# Patient Record
Sex: MALE | Race: WHITE | NOT HISPANIC OR LATINO
[De-identification: names, ages, dates, MRNs, and addresses within clinical notes are randomized per-mention and may not be internally consistent; named-entity substitution may affect disease eponyms.]

---

## 2017-01-04 ENCOUNTER — APPOINTMENT (OUTPATIENT)
Dept: PULMONOLOGY | Facility: CLINIC | Age: 67
End: 2017-01-04

## 2017-03-31 ENCOUNTER — RX RENEWAL (OUTPATIENT)
Age: 67
End: 2017-03-31

## 2017-05-05 ENCOUNTER — RX RENEWAL (OUTPATIENT)
Age: 67
End: 2017-05-05

## 2017-07-13 ENCOUNTER — APPOINTMENT (OUTPATIENT)
Dept: PULMONOLOGY | Facility: CLINIC | Age: 67
End: 2017-07-13

## 2017-07-13 VITALS
BODY MASS INDEX: 34.99 KG/M2 | SYSTOLIC BLOOD PRESSURE: 151 MMHG | WEIGHT: 210 LBS | RESPIRATION RATE: 18 BRPM | OXYGEN SATURATION: 92 % | HEIGHT: 65 IN | HEART RATE: 70 BPM | DIASTOLIC BLOOD PRESSURE: 88 MMHG

## 2017-07-13 RX ORDER — METFORMIN HYDROCHLORIDE 850 MG/1
850 TABLET, COATED ORAL
Qty: 180 | Refills: 0 | Status: ACTIVE | COMMUNITY
Start: 2017-02-04

## 2017-07-21 ENCOUNTER — RX RENEWAL (OUTPATIENT)
Age: 67
End: 2017-07-21

## 2017-11-14 ENCOUNTER — APPOINTMENT (OUTPATIENT)
Dept: PULMONOLOGY | Facility: CLINIC | Age: 67
End: 2017-11-14

## 2017-11-16 ENCOUNTER — RX RENEWAL (OUTPATIENT)
Age: 67
End: 2017-11-16

## 2018-01-16 ENCOUNTER — RX RENEWAL (OUTPATIENT)
Age: 68
End: 2018-01-16

## 2018-03-28 ENCOUNTER — MEDICATION RENEWAL (OUTPATIENT)
Age: 68
End: 2018-03-28

## 2018-04-02 ENCOUNTER — APPOINTMENT (OUTPATIENT)
Dept: PULMONOLOGY | Facility: CLINIC | Age: 68
End: 2018-04-02
Payer: COMMERCIAL

## 2018-04-02 VITALS
HEART RATE: 64 BPM | SYSTOLIC BLOOD PRESSURE: 150 MMHG | DIASTOLIC BLOOD PRESSURE: 70 MMHG | OXYGEN SATURATION: 93 % | RESPIRATION RATE: 18 BRPM | BODY MASS INDEX: 33.15 KG/M2 | HEIGHT: 65 IN | WEIGHT: 199 LBS

## 2018-04-02 PROCEDURE — 94010 BREATHING CAPACITY TEST: CPT

## 2018-04-02 PROCEDURE — 99214 OFFICE O/P EST MOD 30 MIN: CPT | Mod: 25

## 2018-10-02 ENCOUNTER — APPOINTMENT (OUTPATIENT)
Dept: PULMONOLOGY | Facility: CLINIC | Age: 68
End: 2018-10-02

## 2018-10-16 ENCOUNTER — NON-APPOINTMENT (OUTPATIENT)
Age: 68
End: 2018-10-16

## 2018-10-16 ENCOUNTER — APPOINTMENT (OUTPATIENT)
Dept: PULMONOLOGY | Facility: CLINIC | Age: 68
End: 2018-10-16
Payer: COMMERCIAL

## 2018-10-16 VITALS
SYSTOLIC BLOOD PRESSURE: 130 MMHG | WEIGHT: 200 LBS | HEIGHT: 65 IN | HEART RATE: 59 BPM | RESPIRATION RATE: 17 BRPM | BODY MASS INDEX: 33.32 KG/M2 | DIASTOLIC BLOOD PRESSURE: 80 MMHG | OXYGEN SATURATION: 92 %

## 2018-10-16 DIAGNOSIS — Z23 ENCOUNTER FOR IMMUNIZATION: ICD-10-CM

## 2018-10-16 PROCEDURE — 90662 IIV NO PRSV INCREASED AG IM: CPT

## 2018-10-16 PROCEDURE — 94010 BREATHING CAPACITY TEST: CPT

## 2018-10-16 PROCEDURE — G0008: CPT

## 2018-10-16 PROCEDURE — 99214 OFFICE O/P EST MOD 30 MIN: CPT | Mod: 25

## 2019-02-19 ENCOUNTER — TRANSCRIPTION ENCOUNTER (OUTPATIENT)
Age: 69
End: 2019-02-19

## 2019-02-19 ENCOUNTER — APPOINTMENT (OUTPATIENT)
Dept: PULMONOLOGY | Facility: CLINIC | Age: 69
End: 2019-02-19
Payer: MEDICARE

## 2019-02-19 VITALS
HEIGHT: 63 IN | WEIGHT: 190 LBS | SYSTOLIC BLOOD PRESSURE: 120 MMHG | BODY MASS INDEX: 33.66 KG/M2 | DIASTOLIC BLOOD PRESSURE: 80 MMHG | HEART RATE: 59 BPM | RESPIRATION RATE: 17 BRPM | OXYGEN SATURATION: 93 %

## 2019-02-19 PROCEDURE — 99213 OFFICE O/P EST LOW 20 MIN: CPT

## 2019-02-19 RX ORDER — UMECLIDINIUM BROMIDE AND VILANTEROL TRIFENATATE 62.5; 25 UG/1; UG/1
62.5-25 POWDER RESPIRATORY (INHALATION) DAILY
Qty: 3 | Refills: 2 | Status: DISCONTINUED | COMMUNITY
Start: 2017-07-13 | End: 2019-02-19

## 2019-02-19 RX ORDER — MOMETASONE FUROATE 220 UG/1
220 INHALANT RESPIRATORY (INHALATION)
Qty: 3 | Refills: 1 | Status: DISCONTINUED | COMMUNITY
Start: 2017-07-13 | End: 2019-02-19

## 2019-02-19 NOTE — REASON FOR VISIT
97.7 [Follow-Up] : a follow-up visit [Other: _____] : [unfilled] [FreeTextEntry2] : COPD, abnormal Chest CT, nicotine addiction, rhinitis, and obesity .

## 2019-02-19 NOTE — HISTORY OF PRESENT ILLNESS
[Current] : is a current smoker [Cigarettes ___ /Day] : reports smoking [unfilled] packs of cigarettes per day [___ Pack Year History] : [unfilled] pack year history [Adherent] : the patient is adherent with ~his/her~ medication regimen [Goals--Doing Well] : the patient is doing well with ~his/her~ goals [Side Effects] : ~He/She~ denies medication side effects [de-identified] : Asmanex no longer covered - needs to change to Pulmicort  [FreeTextEntry1] : 67 year old male presents for 3 month follow up of COPD, abnormal Chest CT, nicotine addiction, rhinitis, and obesity . \par Generally he feels well.  \par Some am clear sputum\par Does well with inhalers - he does not feel short of breath all day - His insurance is changing preferred ICS - he now must use Pulmicort instead of Asmanex. \par He states his flonase dries him out too much - he has also stopped using Olopatadine daily due to nasal dryness - he uses it every other day. . \par He denies orthopnea\par He continues to walk with a cane post CVA.\par No falls.\par He is still smoking 1 Pack per day - he declined intervention from smoking cessation program \par He has lost 10 lbs in the last 6 months with modest dietary changes\par He reports he snores  but sleeps well. \par He denies dizziness, chest pain, GERD, diarrhea.\par \par \par

## 2019-02-19 NOTE — PHYSICAL EXAM
[Low Lying Soft Palate] : low lying soft palate [III] : III [Neck Appearance] : the appearance of the neck was normal [Neck Cervical Mass (___cm)] : no neck mass was observed [Jugular Venous Distention Increased] : there was no jugular-venous distention [Thyroid Diffuse Enlargement] : the thyroid was not enlarged [Thyroid Nodule] : there were no palpable thyroid nodules [Heart Rate And Rhythm] : heart rate and rhythm were normal [Heart Sounds] : normal S1 and S2 [Murmurs] : no murmurs present [Respiration, Rhythm And Depth] : normal respiratory rhythm and effort [Exaggerated Use Of Accessory Muscles For Inspiration] : no accessory muscle use [Auscultation Breath Sounds / Voice Sounds] : lungs were clear to auscultation bilaterally [Abdomen Soft] : soft [Abdomen Tenderness] : non-tender [Abdomen Mass (___ Cm)] : no abdominal mass palpated [Abnormal Walk] : normal gait [Gait - Sufficient For Exercise Testing] : the gait was sufficient for exercise testing [Nail Clubbing] : no clubbing of the fingernails [Cyanosis, Localized] : no localized cyanosis [Petechial Hemorrhages (___cm)] : no petechial hemorrhages [] : no ischemic changes [Skin Color & Pigmentation] : normal skin color and pigmentation [Oriented To Time, Place, And Person] : oriented to person, place, and time [General Appearance - Well Developed] : well developed [Normal Appearance] : normal appearance [Well Groomed] : well groomed [General Appearance - Well Nourished] : well nourished [Normal Conjunctiva] : the conjunctiva exhibited no abnormalities [Eyelids - No Xanthelasma] : the eyelids demonstrated no xanthelasmas [FreeTextEntry1] : right hand and leg weakness post CVA

## 2019-02-19 NOTE — ASSESSMENT
[FreeTextEntry1] : 68 year old current smoker with COPD - post CVA for followup\par \par Problem # 1 COPD \par Feels well, tolerates activity /no recent illnesses\par Adherent  with Medications\par Needs to change to Pulmicort from Asmanex due to insurance preferences\par Combivent for rescue - vanegas not need refill at this time \par RTO in 6 months or earlier if needed\par \par Problem # 2 Nicotine addiction\par Still smoking 1 PPD \par Refused Tobacco cessation counseling \par \par Problem # 3 Obesity \par Has lost 10 lbs with modest dietary changes\par Given continued smoking CT pending in August 2019\par \par Problem # 4 Abnormal Chest CT \par Centrilobular emphysema , subsegmental atelectasis , no honeycombing , several subcentimeter nodules \par Repeat CT pending August 2019\par \par RTO in 6 months or sooner if needed\par Chest CT in August 2019\par \par \par \par

## 2019-08-20 ENCOUNTER — APPOINTMENT (OUTPATIENT)
Dept: PULMONOLOGY | Facility: CLINIC | Age: 69
End: 2019-08-20
Payer: MEDICARE

## 2019-08-20 VITALS
HEART RATE: 68 BPM | OXYGEN SATURATION: 91 % | WEIGHT: 185 LBS | RESPIRATION RATE: 16 BRPM | BODY MASS INDEX: 32.78 KG/M2 | HEIGHT: 63 IN | SYSTOLIC BLOOD PRESSURE: 130 MMHG | DIASTOLIC BLOOD PRESSURE: 70 MMHG

## 2019-08-20 PROCEDURE — 94060 EVALUATION OF WHEEZING: CPT

## 2019-08-20 PROCEDURE — ZZZZZ: CPT

## 2019-08-20 PROCEDURE — G0296 VISIT TO DETERM LDCT ELIG: CPT

## 2019-08-20 PROCEDURE — 94729 DIFFUSING CAPACITY: CPT

## 2019-08-20 PROCEDURE — 99214 OFFICE O/P EST MOD 30 MIN: CPT | Mod: 25

## 2019-08-20 PROCEDURE — 99406 BEHAV CHNG SMOKING 3-10 MIN: CPT

## 2019-08-20 NOTE — REASON FOR VISIT
[Follow-Up] : a follow-up visit [Spouse] : spouse [FreeTextEntry1] : abnormal chest CT, COPD, nicotine addiction, and non-allergic rhinitis

## 2019-08-20 NOTE — PHYSICAL EXAM
[General Appearance - Well Developed] : well developed [Normal Appearance] : normal appearance [Well Groomed] : well groomed [General Appearance - Well Nourished] : well nourished [No Deformities] : no deformities [General Appearance - In No Acute Distress] : no acute distress [Normal Conjunctiva] : the conjunctiva exhibited no abnormalities [Eyelids - No Xanthelasma] : the eyelids demonstrated no xanthelasmas [Normal Oropharynx] : normal oropharynx [III] : III [Neck Appearance] : the appearance of the neck was normal [Neck Cervical Mass (___cm)] : no neck mass was observed [Jugular Venous Distention Increased] : there was no jugular-venous distention [Thyroid Diffuse Enlargement] : the thyroid was not enlarged [Thyroid Nodule] : there were no palpable thyroid nodules [Heart Rate And Rhythm] : heart rate and rhythm were normal [Heart Sounds] : normal S1 and S2 [Murmurs] : no murmurs present [Respiration, Rhythm And Depth] : normal respiratory rhythm and effort [Exaggerated Use Of Accessory Muscles For Inspiration] : no accessory muscle use [Abdomen Soft] : soft [Abdomen Tenderness] : non-tender [Abdomen Mass (___ Cm)] : no abdominal mass palpated [Gait - Sufficient For Exercise Testing] : the gait was sufficient for exercise testing [Nail Clubbing] : no clubbing of the fingernails [Cyanosis, Localized] : no localized cyanosis [Petechial Hemorrhages (___cm)] : no petechial hemorrhages [Skin Color & Pigmentation] : normal skin color and pigmentation [] : no rash [No Venous Stasis] : no venous stasis [Skin Lesions] : no skin lesions [No Skin Ulcers] : no skin ulcer [No Xanthoma] : no  xanthoma was observed [Deep Tendon Reflexes (DTR)] : deep tendon reflexes were 2+ and symmetric [Sensation] : the sensory exam was normal to light touch and pinprick [No Focal Deficits] : no focal deficits [Oriented To Time, Place, And Person] : oriented to person, place, and time [Affect] : the affect was normal [Impaired Insight] : insight and judgment were intact [FreeTextEntry1] : using a walker

## 2019-08-20 NOTE — ADDENDUM
[FreeTextEntry1] : Documented by Camron Ibrahim acting as a scribe for Dr. Steven Renee on 08/20/2019.\par \par All medical record entries made by the Scribe were at my, Dr. Steven Renee's, direction and personally dictated by me on 08/20/2019. I have reviewed the chart and agree that the record accurately reflects my personal performance of the history, physical exam, assessment and plan. I have also personally directed, reviewed, and agree with the discharge instructions.

## 2019-08-20 NOTE — REVIEW OF SYSTEMS
[Negative] : Sleep Disorder [Cough] : cough [Arthralgias] : arthralgias [Myalgias] : myalgias [Snoring] : snoring [As Noted in HPI] : as noted in HPI [Recent Wt Loss (___ Lbs)] : recent [unfilled] ~Ulb weight loss [Wheezing] : no wheezing

## 2019-08-20 NOTE — HISTORY OF PRESENT ILLNESS
[FreeTextEntry1] : Mr. Ann is a 67 year old male presenting to the office today for a follow up visit for \par abnormal chest CT, COPD, nicotine addiction, and non-allergic rhinitis. His chief complaint is muscle aches.\par -he reports feeling generally well\par -he reports he is sleeping well, with 6-8 hours of sleep nightly\par -his wife notes he snores slightly\par -he notes his energy level is low at 7/10\par -he reports he isn't walking regularly\par -he reports having lost some weight\par -his wife notes he has difficulty walking down the stairs, and walks about half a block before his legs begin to ache.\par -he reports he is smoking a little bit less than a pack per day\par -he reports coughing in the morning when he wakes up\par -he notes his breathing has been controlled\par -he denies taking any new medications, vitamins, or supplements. \par -he notes he went to a new doctor, who raised his Lisinopril to 20 mg \par -he denies any wheezing, chest pain, chest pressure, diarrhea, constipation, dysphagia, dizziness, sour taste in the mouth, heartburn, reflux

## 2019-08-20 NOTE — ASSESSMENT
[FreeTextEntry1] : Mr. Colón is a 68 year old male with a history of COPD, overweight, OSAS, allergy, CVA, HTN, and nicotine addiction presenting to the office today for a follow up visit. He is currently stable from a pulmonary perspective.\par \par His shortness of breath is multifactorial due to:\par -COPD\par -obesity\par -poor breathing mechanics related to poor balance \par -potential underlying cardiac disease\par \par problem 1: COPD (stable)\par -continue to use Anoro 1 inhalation daily\par -continue to use Asmanex 2 inhalations BID\par -continue to use Combivent as a rescue inhaler\par \par -COPD is a progressive disease and although it can’t be cured , appropriate management can slow its progression, reduce frequency and severity of exacerbations, and improve symptoms and the patient quality of life. Hospitalizations are the greatest contributor to the total COPD costs and account for up to 87% of total COPD related costs. Exacerbations are the main cause of admissions and subsequently account for the 40-75% of COPD costs. Inhaled maintenance therapy reduces the incidence of exacerbations in patients with stable COPD. Incorrect inhaler use and nonadherence are major obstacles to achieving COPD treatment goals. Many COPD patients have challenges (impaired inhalation, limited dexterity, reduced cognition: that limit their ability to correctly use their COPD treatment devices resulting in reduced symptom control. Of most importance is smoking cessation and early intervention with respiratory illnesses and contemplation for pulmonary rehab to enhance quality of life. \par -Inhaler technique reviewed as well as oral hygiene techniques reviewed with patient. Avoidance of cold air, extremes of temperature, rescue inhaler should be used before exercise. Order of medication reviewed with patient. Recommended use of a cool mist humidifier in the bedroom. \par \par problem 2: allergies and sinuses:\par -continue Flonase 1 sniff each nostril BID \par -continue to use Olopatadine 1 sniff/nostril BID \par \par -Environmental measures for allergies were encouraged including mattress and pillow cover, air purifier, and environmental controls. \par \par problem 3: nonallergic rhinitis:\par -continue Atrovent (0.6%) nasal spray 1 sniff each nostril up to TID\par \par problem 4: history of abnormal chest CT / Lung Cancer Screening\par -He was dismissed by Dr. Pieter Solis\par -He should have a follow up low dose chest CT due now.\par -Discussed with patient about the lung cancer screening study (with [facility]), and is being set up for the study\par \par problem 5: smoking and nicotine addiction \par -He is being prescribed Wellbutrin \par -Discussed the risks/associations with continued smoking including COPD, emphysema, shortness of breath, renal cancer, bladder cancer, stroke risk, cardiac disease, etc. Smoking cessation was discussed at length and highly encouraged. Various options to aid cessation was discussed including use of Chantix, Nicotrol, nicotine products, laser therapy, hypnosis, Wellbutrin, etc. \par \par problem 6: obesity \par -Weight loss, exercise, and diet control were discussed and are highly encouraged. Treatment options were given such as, aqua therapy, and contacting a nutritionist. Recommended to use the elliptical, stationary bike, less use of treadmill.  Obesity is associated with worsening asthma, shortness of breath, and potential for cardiac disease, diabetes, and other underlying medical conditions.\par \par problem 7: ? JESS\par -based on his fatigue, EDS, snoring, elevated mallampati class he is being set up for an at home sleep study\par \par -Sleep apnea is associated with adverse clinical consequences which an affect most organ systems.  Cardiovascular disease risk includes arrhythmias, atrial fibrillation, hypertension, coronary artery disease, and stroke. Metabolic disorders include diabetes type 2, non-alcoholic fatty liver disease. Mood disorder especially depression; and cognitive decline especially in the elderly. Associations with  chronic reflux/Cortés’s esophagus some but not all inclusive. \par -Reasons to assess this include arousal consistent with hypopnea; respiratory events most prominent in REM sleep or supine position; therefore sleep staging and body position are important for accurate diagnosis and estimation of AHI. \par \par problem 8: health maintenance \par -recommended yearly flu shot after October 15\par -recommended strep pneumonia vaccines: Prevnar-13 vaccine, followed by Pneumo vaccine 23 one year following\par -recommended early intervention for URIs\par -recommended regular osteoporosis evaluations\par -recommended early dermatological evaluations\par -recommended after the age of 50 to the age of 70, colonoscopy every 5 years \par \par He should follow up in 6 months.\par  He is encouraged to call with any changes, concerns, or questions. \par \par

## 2019-08-20 NOTE — PROCEDURE
[FreeTextEntry1] : PFT with SPI and DLCO revealed moderate-severe obstructive dysfunction, with a FEV1 of 1.39L, which is 51% of predicted, with 11% improvement with use of bronchodilator, and a mildly reduced diffusion of 14.1, which is 66% of predicted, with a normal flow volume loop

## 2019-11-18 ENCOUNTER — TRANSCRIPTION ENCOUNTER (OUTPATIENT)
Age: 69
End: 2019-11-18

## 2020-02-25 ENCOUNTER — NON-APPOINTMENT (OUTPATIENT)
Age: 70
End: 2020-02-25

## 2020-02-25 ENCOUNTER — APPOINTMENT (OUTPATIENT)
Dept: PULMONOLOGY | Facility: CLINIC | Age: 70
End: 2020-02-25
Payer: MEDICARE

## 2020-02-25 VITALS
RESPIRATION RATE: 17 BRPM | WEIGHT: 187 LBS | SYSTOLIC BLOOD PRESSURE: 120 MMHG | HEIGHT: 63 IN | BODY MASS INDEX: 33.13 KG/M2 | OXYGEN SATURATION: 92 % | HEART RATE: 63 BPM | DIASTOLIC BLOOD PRESSURE: 80 MMHG

## 2020-02-25 PROCEDURE — 95012 NITRIC OXIDE EXP GAS DETER: CPT

## 2020-02-25 PROCEDURE — 90670 PCV13 VACCINE IM: CPT

## 2020-02-25 PROCEDURE — 94010 BREATHING CAPACITY TEST: CPT

## 2020-02-25 PROCEDURE — G0009: CPT

## 2020-02-25 PROCEDURE — 99406 BEHAV CHNG SMOKING 3-10 MIN: CPT | Mod: 25

## 2020-02-25 PROCEDURE — 99214 OFFICE O/P EST MOD 30 MIN: CPT | Mod: 25

## 2020-02-25 NOTE — ASSESSMENT
[FreeTextEntry1] : Mr. Colón is a 69 year old male with a history of COPD, overweight, OSAS, allergy, CVA, HTN, and nicotine addiction presenting to the office today for a follow up visit. He is currently stable from a pulmonary perspective. (some mucus production)\par \par His shortness of breath is multifactorial due to:\par -COPD\par -obesity\par -poor breathing mechanics related to poor balance \par -potential underlying cardiac disease\par \par problem 1: COPD (stable)\par - Recommended the use of Aerobika to help bring up mucus \par -continue to use Anoro 1 inhalation daily\par -continue to use Asmanex 2 inhalations BID\par -continue to use Combivent as a rescue inhaler\par \par -COPD is a progressive disease and although it can’t be cured , appropriate management can slow its progression, reduce frequency and severity of exacerbations, and improve symptoms and the patient quality of life. Hospitalizations are the greatest contributor to the total COPD costs and account for up to 87% of total COPD related costs. Exacerbations are the main cause of admissions and subsequently account for the 40-75% of COPD costs. Inhaled maintenance therapy reduces the incidence of exacerbations in patients with stable COPD. Incorrect inhaler use and nonadherence are major obstacles to achieving COPD treatment goals. Many COPD patients have challenges (impaired inhalation, limited dexterity, reduced cognition: that limit their ability to correctly use their COPD treatment devices resulting in reduced symptom control. Of most importance is smoking cessation and early intervention with respiratory illnesses and contemplation for pulmonary rehab to enhance quality of life. \par -Inhaler technique reviewed as well as oral hygiene techniques reviewed with patient. Avoidance of cold air, extremes of temperature, rescue inhaler should be used before exercise. Order of medication reviewed with patient. Recommended use of a cool mist humidifier in the bedroom. \par \par problem 2: allergies and sinuses:\par -continue Flonase 1 sniff each nostril BID \par -continue to use Olopatadine 1 sniff/nostril BID (.6)\par \par -Environmental measures for allergies were encouraged including mattress and pillow cover, air purifier, and environmental controls. \par \par problem 3: nonallergic rhinitis:\par -continue Atrovent (0.6%) nasal spray 1 sniff each nostril up to TID\par \par problem 4: history of abnormal chest CT / Lung Cancer Screening\par -He was dismissed by Dr. Pieter Solis\par -He should have a follow up low dose chest CT 10/2021\par -Discussed with patient about the lung cancer screening study (with [facility]), and is being set up for the study\par \par problem 5: smoking and nicotine addiction (2.25.2020)\par -He is being prescribed Wellbutrin \par -Discussed the risks/associations with continued smoking including COPD, emphysema, shortness of breath, renal cancer, bladder cancer, stroke risk, cardiac disease, etc. Smoking cessation was discussed at length and highly encouraged. Various options to aid cessation was discussed including use of Chantix, Nicotrol, nicotine products, laser therapy, hypnosis, Wellbutrin, etc. \par \par problem 6: obesity \par -Weight loss, exercise, and diet control were discussed and are highly encouraged. Treatment options were given such as, aqua therapy, and contacting a nutritionist. Recommended to use the elliptical, stationary bike, less use of treadmill.  Obesity is associated with worsening asthma, shortness of breath, and potential for cardiac disease, diabetes, and other underlying medical conditions.\par \par problem 7: ? JESS\par -based on his fatigue, EDS, snoring, elevated mallampati class he is being set up for an at home sleep study\par \par -Sleep apnea is associated with adverse clinical consequences which an affect most organ systems.  Cardiovascular disease risk includes arrhythmias, atrial fibrillation, hypertension, coronary artery disease, and stroke. Metabolic disorders include diabetes type 2, non-alcoholic fatty liver disease. Mood disorder especially depression; and cognitive decline especially in the elderly. Associations with  chronic reflux/Cortés’s esophagus some but not all inclusive. \par -Reasons to assess this include arousal consistent with hypopnea; respiratory events most prominent in REM sleep or supine position; therefore sleep staging and body position are important for accurate diagnosis and estimation of AHI. \par \par problem 8: health maintenance \par -recommended yearly flu shot after October 15\par -recommended strep pneumonia vaccines: Prevnar-13 vaccine, followed by Pneumo vaccine 23 one year following (2/2020)\par -recommended early intervention for URIs\par -recommended regular osteoporosis evaluations\par -recommended early dermatological evaluations\par -recommended after the age of 50 to the age of 70, colonoscopy every 5 years \par \par He should follow up in 6 months.\par  He is encouraged to call with any changes, concerns, or questions. \par \par

## 2020-02-25 NOTE — PHYSICAL EXAM
[Normal Appearance] : normal appearance [General Appearance - Well Developed] : well developed [General Appearance - Well Nourished] : well nourished [Well Groomed] : well groomed [No Deformities] : no deformities [Normal Conjunctiva] : the conjunctiva exhibited no abnormalities [General Appearance - In No Acute Distress] : no acute distress [Eyelids - No Xanthelasma] : the eyelids demonstrated no xanthelasmas [Normal Oropharynx] : normal oropharynx [Neck Appearance] : the appearance of the neck was normal [III] : III [Neck Cervical Mass (___cm)] : no neck mass was observed [Thyroid Nodule] : there were no palpable thyroid nodules [Thyroid Diffuse Enlargement] : the thyroid was not enlarged [Jugular Venous Distention Increased] : there was no jugular-venous distention [Heart Rate And Rhythm] : heart rate and rhythm were normal [Heart Sounds] : normal S1 and S2 [Murmurs] : no murmurs present [Respiration, Rhythm And Depth] : normal respiratory rhythm and effort [Abdomen Soft] : soft [Exaggerated Use Of Accessory Muscles For Inspiration] : no accessory muscle use [Abdomen Tenderness] : non-tender [Abdomen Mass (___ Cm)] : no abdominal mass palpated [Gait - Sufficient For Exercise Testing] : the gait was sufficient for exercise testing [Cyanosis, Localized] : no localized cyanosis [Petechial Hemorrhages (___cm)] : no petechial hemorrhages [Nail Clubbing] : no clubbing of the fingernails [Skin Color & Pigmentation] : normal skin color and pigmentation [No Venous Stasis] : no venous stasis [Skin Lesions] : no skin lesions [] : no rash [No Skin Ulcers] : no skin ulcer [No Xanthoma] : no  xanthoma was observed [Deep Tendon Reflexes (DTR)] : deep tendon reflexes were 2+ and symmetric [Sensation] : the sensory exam was normal to light touch and pinprick [Oriented To Time, Place, And Person] : oriented to person, place, and time [No Focal Deficits] : no focal deficits [Impaired Insight] : insight and judgment were intact [Affect] : the affect was normal [FreeTextEntry1] : using a walker

## 2020-02-25 NOTE — HISTORY OF PRESENT ILLNESS
[FreeTextEntry1] : Mr. Ann is a 69 year old male presenting to the office today for a follow up visit for \par abnormal chest CT, COPD, nicotine addiction, and non-allergic rhinitis. His chief complaint is\par - he has been feeling alright, he has some congested sinuses \par - He has not been sleeping well this week \par - He denies palpitations\par - His breathing has been fine \par - He has been using his inhaler everyday \par - He has been smoking a little less than a pack a day\par - He notes his SOB varies, some days its bad some days its better. He uses the inhaler to control his SOB and it has been helping.  \par - He has been snoring slightly \par -he denies any headaches, nausea, vomiting, fever, chills, sweats, chest pain, chest pressure, diarrhea, constipation, dysphagia, dizziness, sour taste in the mouth, leg swelling, leg pain, itchy eyes, itchy ears, heartburn, reflux, myalgias or arthralgias.\par

## 2020-02-25 NOTE — PROCEDURE
[FreeTextEntry1] : PFT revealed mild restrictive obstructive flows, with a FEV1 of 1.03 L, which is 41% of predicted, with a normal flow volume loop\par \par FENO was 21 ; a normal value being less than 25\par Fractional exhaled nitric oxide (FENO) is regarded as a simple, noninvasive method for assessing eosinophilic airway inflammation. Produced by a variety of cells within the lung, nitric oxide (NO) concentrations are generally low in healthy individuals. However, high concentrations of NO appear to be involved in nonspecific host defense mechanisms and chronic inflammatory diseases such as asthma. The American Thor[employer] Society (ATS) therefore has recommended using FENO to aid in the diagnosis and monitoring of eosinophilic airway inflammation and asthma, and for identifying steroid responsive individuals whose chronic respiratory symptoms may be caused by airway inflammation.\par \par Chest CT (10.30.19) reveals there are stable small pulmonary nodules. There is no evidence for malignancy in the thorax or upper abdomen.\par There is atelectasis in the lower lungs, with possible associated scarring, as before. There are findings related to smoking, as before. There is atherosclerosis.\par There are additional findings as are noted in the body of the report, which are stable.\par

## 2020-02-25 NOTE — ADDENDUM
[FreeTextEntry1] : Documented by Tiffanie Mccann  acting as a scribe for Dr. Steven Renee on 02/25/2020.\par \par All medical record entries made by the Scribe were at my, Dr. Steven Renee's, direction and personally dictated by me on 02/25/2020. I have reviewed the chart and agree that the record accurately reflects my personal performance of the history, physical exam, assessment and plan. I have also personally directed, reviewed, and agree with the discharge instructions.\par

## 2020-05-19 RX ORDER — MOMETASONE FUROATE 220 UG/1
220 INHALANT RESPIRATORY (INHALATION)
Qty: 3 | Refills: 1 | Status: DISCONTINUED | COMMUNITY
Start: 2018-04-02 | End: 2020-05-19

## 2020-05-19 RX ORDER — UMECLIDINIUM BROMIDE AND VILANTEROL TRIFENATATE 62.5; 25 UG/1; UG/1
62.5-25 POWDER RESPIRATORY (INHALATION) DAILY
Qty: 3 | Refills: 2 | Status: DISCONTINUED | COMMUNITY
Start: 2018-04-02 | End: 2020-05-19

## 2020-05-19 RX ORDER — BUDESONIDE 180 UG/1
180 AEROSOL, POWDER RESPIRATORY (INHALATION)
Qty: 3 | Refills: 1 | Status: DISCONTINUED | COMMUNITY
Start: 2019-08-20 | End: 2020-05-19

## 2020-05-19 RX ORDER — BUDESONIDE 180 UG/1
180 AEROSOL, POWDER RESPIRATORY (INHALATION)
Qty: 3 | Refills: 1 | Status: DISCONTINUED | OUTPATIENT
Start: 2018-10-30 | End: 2020-05-19

## 2020-08-25 ENCOUNTER — APPOINTMENT (OUTPATIENT)
Dept: PULMONOLOGY | Facility: CLINIC | Age: 70
End: 2020-08-25
Payer: MEDICARE

## 2020-08-25 VITALS
BODY MASS INDEX: 34.41 KG/M2 | WEIGHT: 187 LBS | OXYGEN SATURATION: 93 % | TEMPERATURE: 97.2 F | HEIGHT: 62 IN | RESPIRATION RATE: 16 BRPM | HEART RATE: 62 BPM | DIASTOLIC BLOOD PRESSURE: 70 MMHG | SYSTOLIC BLOOD PRESSURE: 130 MMHG

## 2020-08-25 PROCEDURE — 99214 OFFICE O/P EST MOD 30 MIN: CPT | Mod: 25

## 2020-08-25 PROCEDURE — 94618 PULMONARY STRESS TESTING: CPT

## 2020-08-25 NOTE — HISTORY OF PRESENT ILLNESS
[FreeTextEntry1] : Mr. Ann is a 69 year old male presenting to the office today for a follow up visit for \par abnormal chest CT, COPD, nicotine addiction, and non-allergic rhinitis. His chief complaint is still smoking\par \par -he notes generally feeling well\par -he notes 6 to 7 hours of sleep on average\par -he notes waking well rested\par -He notes His bowels are regular\par -he notes weight increased 8 lbs\par -he denies vocal hoarseness\par -he notes still smoking almost a pack a day \par -he denies cough\par -he denies wheeze\par \par -he denies any chest pain, chest pressure, diarrhea, constipation, dysphagia, dizziness, sour taste in the mouth, leg swelling, leg pain, itchy eyes, itchy ears, heartburn, reflux, myalgias or arthralgias.

## 2020-08-25 NOTE — ADDENDUM
[FreeTextEntry1] : Documented by Jon Perales acting as a scribe for Dr. Steven Renee on 08/25/2020.\par \par All medical record entries made by the Scribe were at my, Dr. Steven Renee's, direction and personally dictated by me on 08/25/2020. I have reviewed the chart and agree that the record accurately reflects my personal performance of the history, physical exam, assessment and plan. I have also personally directed, reviewed, and agree with the discharge instructions.

## 2020-08-25 NOTE — PHYSICAL EXAM
[General Appearance - Well Developed] : well developed [Well Groomed] : well groomed [General Appearance - Well Nourished] : well nourished [Normal Appearance] : normal appearance [No Deformities] : no deformities [General Appearance - In No Acute Distress] : no acute distress [Eyelids - No Xanthelasma] : the eyelids demonstrated no xanthelasmas [Normal Conjunctiva] : the conjunctiva exhibited no abnormalities [Normal Oropharynx] : normal oropharynx [III] : III [Neck Appearance] : the appearance of the neck was normal [Thyroid Diffuse Enlargement] : the thyroid was not enlarged [Thyroid Nodule] : there were no palpable thyroid nodules [Jugular Venous Distention Increased] : there was no jugular-venous distention [Neck Cervical Mass (___cm)] : no neck mass was observed [Heart Sounds] : normal S1 and S2 [Murmurs] : no murmurs present [Heart Rate And Rhythm] : heart rate and rhythm were normal [Exaggerated Use Of Accessory Muscles For Inspiration] : no accessory muscle use [Respiration, Rhythm And Depth] : normal respiratory rhythm and effort [Abdomen Soft] : soft [Abdomen Tenderness] : non-tender [Gait - Sufficient For Exercise Testing] : the gait was sufficient for exercise testing [Abdomen Mass (___ Cm)] : no abdominal mass palpated [Cyanosis, Localized] : no localized cyanosis [Nail Clubbing] : no clubbing of the fingernails [Skin Color & Pigmentation] : normal skin color and pigmentation [Petechial Hemorrhages (___cm)] : no petechial hemorrhages [No Venous Stasis] : no venous stasis [Skin Lesions] : no skin lesions [] : no rash [No Xanthoma] : no  xanthoma was observed [No Skin Ulcers] : no skin ulcer [Sensation] : the sensory exam was normal to light touch and pinprick [Deep Tendon Reflexes (DTR)] : deep tendon reflexes were 2+ and symmetric [Oriented To Time, Place, And Person] : oriented to person, place, and time [No Focal Deficits] : no focal deficits [Affect] : the affect was normal [Impaired Insight] : insight and judgment were intact [FreeTextEntry1] : using a cane

## 2020-08-25 NOTE — PROCEDURE
[FreeTextEntry1] : 6 minute walk test reveals a low saturation of 90% with moderate evidence of dyspnea or fatigue; walked 65.2  meters

## 2020-08-25 NOTE — ASSESSMENT
[FreeTextEntry1] : Mr. Colón is a 69 year old male with a history of COPD, overweight, OSAS, allergy, CVA, HTN, and nicotine addiction presenting to the office today for a follow up visit. He is currently stable from a pulmonary perspective. (stable) \par \par His shortness of breath is multifactorial due to:\par -COPD\par -obesity\par -poor breathing mechanics related to poor balance \par -potential underlying cardiac disease\par \par problem 1: COPD (stable)\par - Recommended the use of Aerobika to help bring up mucus \par -continue to use Anoro 1 inhalation daily\par -continue to use Asmanex 2 inhalations BID\par -continue to use Combivent as a rescue inhaler\par \par -COPD is a progressive disease and although it can’t be cured , appropriate management can slow its progression, reduce frequency and severity of exacerbations, and improve symptoms and the patient quality of life. Hospitalizations are the greatest contributor to the total COPD costs and account for up to 87% of total COPD related costs. Exacerbations are the main cause of admissions and subsequently account for the 40-75% of COPD costs. Inhaled maintenance therapy reduces the incidence of exacerbations in patients with stable COPD. Incorrect inhaler use and nonadherence are major obstacles to achieving COPD treatment goals. Many COPD patients have challenges (impaired inhalation, limited dexterity, reduced cognition: that limit their ability to correctly use their COPD treatment devices resulting in reduced symptom control. Of most importance is smoking cessation and early intervention with respiratory illnesses and contemplation for pulmonary rehab to enhance quality of life. \par -Inhaler technique reviewed as well as oral hygiene techniques reviewed with patient. Avoidance of cold air, extremes of temperature, rescue inhaler should be used before exercise. Order of medication reviewed with patient. Recommended use of a cool mist humidifier in the bedroom. \par \par problem 2: allergies and sinuses:\par -continue Flonase 1 sniff each nostril BID \par -continue to use Olopatadine 1 sniff/nostril BID (.6)\par \par -Environmental measures for allergies were encouraged including mattress and pillow cover, air purifier, and environmental controls. \par \par problem 3: nonallergic rhinitis:\par -continue Atrovent (0.6%) nasal spray 1 sniff each nostril up to TID\par \par problem 4: history of abnormal chest CT / Lung Cancer Screening\par -He was dismissed by Dr. Pieter Solis\par -He should have a follow up low dose chest CT 10/2020\par -Discussed with patient about the lung cancer screening study (with [facility]), and is being set up for the study\par \par problem 5: smoking and nicotine addiction (8.25.2020)\par -He is being prescribed Wellbutrin \par -Discussed the risks/associations with continued smoking including COPD, emphysema, shortness of breath, renal cancer, bladder cancer, stroke risk, cardiac disease, etc. Smoking cessation was discussed at length and highly encouraged. Various options to aid cessation was discussed including use of Chantix, Nicotrol, nicotine products, laser therapy, hypnosis, Wellbutrin, etc. \par \par problem 6: obesity \par -Weight loss, exercise, and diet control were discussed and are highly encouraged. Treatment options were given such as, aqua therapy, and contacting a nutritionist. Recommended to use the elliptical, stationary bike, less use of treadmill.  Obesity is associated with worsening asthma, shortness of breath, and potential for cardiac disease, diabetes, and other underlying medical conditions.\par \par problem 7: ? JESS\par -based on his fatigue, EDS, snoring, elevated mallampati class he is being set up for an at home sleep study\par \par -Sleep apnea is associated with adverse clinical consequences which an affect most organ systems.  Cardiovascular disease risk includes arrhythmias, atrial fibrillation, hypertension, coronary artery disease, and stroke. Metabolic disorders include diabetes type 2, non-alcoholic fatty liver disease. Mood disorder especially depression; and cognitive decline especially in the elderly. Associations with  chronic reflux/Cortés’s esophagus some but not all inclusive. \par -Reasons to assess this include arousal consistent with hypopnea; respiratory events most prominent in REM sleep or supine position; therefore sleep staging and body position are important for accurate diagnosis and estimation of AHI. \par \par problem 8: Health Maintenance/COVID19 Precautions:\par - Clean your hands often. Wash your hands often with soap and water for at least 20 seconds, especially after blowing your nose, coughing, or sneezing, or having been in a public place.\par - If soap and water are not available, use a hand  that contains at least 60% alcohol.\par - To the extent possible, avoid touching high-touch surfaces in public places - elevator buttons, door handles, handrails, handshaking with people, etc. Use a tissue or your sleeve to cover your hand or finger if you must touch something.\par - Wash your hands after touching surfaces in public places.\par - Avoid touching your face, nose, eyes, etc.\par - Clean and disinfect your home to remove germs: practice routine cleaning of frequently touched surfaces (for example: tables, doorknobs, light switches, handles, desks, toilets, faucets, sinks & cell phones)\par - Avoid crowds, especially in poorly ventilated spaces. Your risk of exposure to respiratory viruses like COVID-19 may increase in crowded, closed-in settings with little air circulation if there are people in the crowd who are sick. All patients are recommended to practice social distancing and stay at least 6 feet away from others.\par - Avoid all non-essential travel including plane trips, and especially avoid embarking on cruise ships.\par -If COVID-19 is spreading in your community, take extra measures to put distance between yourself and other people to further reduce your risk of being exposed to this new virus.\par -Stay home as much as possible.\par - Consider ways of getting food brought to your house through family, social, or commercial networks\par -Be aware that the virus has been known to live in the air up to 3 hours post exposure, cardboard up to 24 hours post exposure, copper up to 4 hours post exposure, steel and plastic up to 2-3 days post exposure. Risk of transmission from these surfaces are affected by many variables.\par Immune Support Recommendations:\par -OTC Vitamin C 500mg BID \par -OTC Quercetin 250-500mg BID \par -OTC Zinc 75-100mg per day \par -OTC Melatonin 1 or 2 mg a night \par -OTC Vitamin D 1-4000mg per day \par -OTC Tonic Water 8oz per day\par Asthma and COVID19:\par You need to make sure your asthma is under control. This often requires the use of inhaled corticosteroids (and sometimes oral corticosteroids). Inhaled corticosteroids do not likely reduce your immune system’s ability to fight infections, but oral corticosteroids may. It is important to use the steps above to protect yourself to limit your exposure to any respiratory virus. \par \par problem 9: health maintenance \par -recommended yearly flu shot after October 15\par -recommended strep pneumonia vaccines: Prevnar-13 vaccine, followed by Pneumo vaccine 23 one year following (2/2020)\par -recommended early intervention for URIs\par -recommended regular osteoporosis evaluations\par -recommended early dermatological evaluations\par -recommended after the age of 50 to the age of 70, colonoscopy every 5 years \par \par He should follow up in 6 months.\par  He is encouraged to call with any changes, concerns, or questions. \par \par

## 2020-09-10 RX ORDER — BUDESONIDE 180 UG/1
180 AEROSOL, POWDER RESPIRATORY (INHALATION)
Qty: 3 | Refills: 2 | Status: DISCONTINUED | COMMUNITY
Start: 2019-02-19 | End: 2020-09-10

## 2021-02-25 ENCOUNTER — APPOINTMENT (OUTPATIENT)
Dept: PULMONOLOGY | Facility: CLINIC | Age: 71
End: 2021-02-25
Payer: MEDICARE

## 2021-02-25 VITALS
SYSTOLIC BLOOD PRESSURE: 126 MMHG | OXYGEN SATURATION: 93 % | TEMPERATURE: 97.2 F | BODY MASS INDEX: 34.96 KG/M2 | HEART RATE: 72 BPM | DIASTOLIC BLOOD PRESSURE: 78 MMHG | RESPIRATION RATE: 16 BRPM | HEIGHT: 62 IN | WEIGHT: 190 LBS

## 2021-02-25 PROCEDURE — 99072 ADDL SUPL MATRL&STAF TM PHE: CPT

## 2021-02-25 PROCEDURE — 99214 OFFICE O/P EST MOD 30 MIN: CPT | Mod: 25

## 2021-02-25 PROCEDURE — 99406 BEHAV CHNG SMOKING 3-10 MIN: CPT | Mod: 25

## 2021-02-25 NOTE — ASSESSMENT
[FreeTextEntry1] : Mr. Colón is a 69 year old male with a history of COPD, overweight, OSAS, allergy, CVA, HTN, and nicotine addiction presenting to the office today for a follow up visit. He is currently stable from a pulmonary perspective. (stable) , poor sleep\par \par His shortness of breath is multifactorial due to:\par -COPD\par -obesity\par -poor breathing mechanics related to poor balance \par -potential underlying cardiac disease\par \par problem 1: COPD (stable)\par - Recommended the use of Aerobika to help bring up mucus \par -continue to use Anoro 1 inhalation daily\par -continue to use Asmanex 2 inhalations BID\par -continue to use Combivent as a rescue inhaler\par \par -COPD is a progressive disease and although it can’t be cured , appropriate management can slow its progression, reduce frequency and severity of exacerbations, and improve symptoms and the patient quality of life. Hospitalizations are the greatest contributor to the total COPD costs and account for up to 87% of total COPD related costs. Exacerbations are the main cause of admissions and subsequently account for the 40-75% of COPD costs. Inhaled maintenance therapy reduces the incidence of exacerbations in patients with stable COPD. Incorrect inhaler use and nonadherence are major obstacles to achieving COPD treatment goals. Many COPD patients have challenges (impaired inhalation, limited dexterity, reduced cognition: that limit their ability to correctly use their COPD treatment devices resulting in reduced symptom control. Of most importance is smoking cessation and early intervention with respiratory illnesses and contemplation for pulmonary rehab to enhance quality of life. \par -Inhaler technique reviewed as well as oral hygiene techniques reviewed with patient. Avoidance of cold air, extremes of temperature, rescue inhaler should be used before exercise. Order of medication reviewed with patient. Recommended use of a cool mist humidifier in the bedroom. \par \par problem 2: allergies and sinuses:\par -continue Flonase 1 sniff each nostril BID \par -continue to use Olopatadine 1 sniff/nostril BID (.6)\par \par -Environmental measures for allergies were encouraged including mattress and pillow cover, air purifier, and environmental controls. \par \par problem 3: nonallergic rhinitis:\par -continue Atrovent (0.6%) nasal spray 1 sniff each nostril up to TID\par \par problem 4: history of abnormal chest CT / Lung Cancer Screening\par -He was dismissed by Dr. Pieter Solis\par -He should have a follow up low dose chest CT 10/2020, next 3/2021 (pending)\par -Discussed with patient about the lung cancer screening study (with [facility]), and is being set up for the study\par \par CAT scans are the only radiological modality to identify abnormalities w/in the lungs with regards to nodules/masses/lymph nodes. Risks, benefits were reviewed in detail. The guidelines for abnormalities include follow up CT scans at various intervals which could range from 6 weeks to 1 year intervals. If there is a change for the worse then consideration for a biopsy will be considered if you are a candidate. Second opinion evaluation with a thoracic surgeon or an interventional radiologist could be offered. \par \par problem 5: smoking and nicotine addiction ( 02/25/2021 )\par -He is being prescribed Wellbutrin \par -Discussed the risks/associations with continued smoking including COPD, emphysema, shortness of breath, renal cancer, bladder cancer, stroke risk, cardiac disease, etc. Smoking cessation was discussed at length and highly encouraged. Various options to aid cessation was discussed including use of Chantix, Nicotrol, nicotine products, laser therapy, hypnosis, Wellbutrin, etc. \par \par problem 6: obesity \par -Weight loss, exercise, and diet control were discussed and are highly encouraged. Treatment options were given such as, aqua therapy, and contacting a nutritionist. Recommended to use the elliptical, stationary bike, less use of treadmill.  Obesity is associated with worsening asthma, shortness of breath, and potential for cardiac disease, diabetes, and other underlying medical conditions.\par \par problem 7: ? JESS (no interest)\par -based on his fatigue, EDS, snoring, elevated mallampati class he is being set up for an at home sleep study\par \par -Sleep apnea is associated with adverse clinical consequences which an affect most organ systems.  Cardiovascular disease risk includes arrhythmias, atrial fibrillation, hypertension, coronary artery disease, and stroke. Metabolic disorders include diabetes type 2, non-alcoholic fatty liver disease. Mood disorder especially depression; and cognitive decline especially in the elderly. Associations with  chronic reflux/Cortés’s esophagus some but not all inclusive. \par -Reasons to assess this include arousal consistent with hypopnea; respiratory events most prominent in REM sleep or supine position; therefore sleep staging and body position are important for accurate diagnosis and estimation of AHI. \par \par problem 8: Health Maintenance/COVID19 Precautions:\par -S/p Covid vaccine 2/2021\par - Clean your hands often. Wash your hands often with soap and water for at least 20 seconds, especially after blowing your nose, coughing, or sneezing, or having been in a public place.\par - If soap and water are not available, use a hand  that contains at least 60% alcohol.\par - To the extent possible, avoid touching high-touch surfaces in public places - elevator buttons, door handles, handrails, handshaking with people, etc. Use a tissue or your sleeve to cover your hand or finger if you must touch something.\par - Wash your hands after touching surfaces in public places.\par - Avoid touching your face, nose, eyes, etc.\par - Clean and disinfect your home to remove germs: practice routine cleaning of frequently touched surfaces (for example: tables, doorknobs, light switches, handles, desks, toilets, faucets, sinks & cell phones)\par - Avoid crowds, especially in poorly ventilated spaces. Your risk of exposure to respiratory viruses like COVID-19 may increase in crowded, closed-in settings with little air circulation if there are people in the crowd who are sick. All patients are recommended to practice social distancing and stay at least 6 feet away from others.\par - Avoid all non-essential travel including plane trips, and especially avoid embarking on cruise ships.\par -If COVID-19 is spreading in your community, take extra measures to put distance between yourself and other people to further reduce your risk of being exposed to this new virus.\par -Stay home as much as possible.\par - Consider ways of getting food brought to your house through family, social, or commercial networks\par -Be aware that the virus has been known to live in the air up to 3 hours post exposure, cardboard up to 24 hours post exposure, copper up to 4 hours post exposure, steel and plastic up to 2-3 days post exposure. Risk of transmission from these surfaces are affected by many variables.\par Immune Support Recommendations:\par -OTC Vitamin C 500mg BID \par -OTC Quercetin 250-500mg BID \par -OTC Zinc 75-100mg per day \par -OTC Melatonin 1 or 2 mg a night \par -OTC Vitamin D 1-4000mg per day \par -OTC Tonic Water 8oz per day\par Asthma and COVID19:\par You need to make sure your asthma is under control. This often requires the use of inhaled corticosteroids (and sometimes oral corticosteroids). Inhaled corticosteroids do not likely reduce your immune system’s ability to fight infections, but oral corticosteroids may. It is important to use the steps above to protect yourself to limit your exposure to any respiratory virus. \par \par problem 9: health maintenance \par -recommended yearly flu shot after October 15\par -recommended strep pneumonia vaccines: Prevnar-13 vaccine, followed by Pneumo vaccine 23 one year following (2/2020)\par -recommended early intervention for URIs\par -recommended regular osteoporosis evaluations\par -recommended early dermatological evaluations\par -recommended after the age of 50 to the age of 70, colonoscopy every 5 years \par \par He should follow up in 6 months.\par  He is encouraged to call with any changes, concerns, or questions. \par \par

## 2021-02-25 NOTE — REVIEW OF SYSTEMS
[Negative] : Endocrine [Chest Discomfort] : no chest discomfort [GERD] : no gerd [Diarrhea] : no diarrhea [Constipation] : no constipation [Dysphagia] : no dysphagia [Dizziness] : no dizziness

## 2021-02-25 NOTE — PHYSICAL EXAM

## 2021-02-25 NOTE — HISTORY OF PRESENT ILLNESS
[FreeTextEntry1] : Mr. Ann is a 70 year old male presenting to the office today for a follow up visit for \par abnormal chest CT, COPD, nicotine addiction, and non-allergic rhinitis. His chief complaint is odd sleep pattern\par -he reports feeling generally well\par -he states he has been sleeping less lately, about 5 hours. He is unsure why\par -he notes his energy level is okay\par -he report he doesn’t get much physical activity\par -he is s/p the 1st Moderna Covid injection with no issue\par -he reports he lost about 50 pounds\par -he states he believes he snores mildly\par -he states his memory and concentration are good \par -he reports he is still smoking about 1 pack per day\par -he denies taking any new medications, vitamins, or supplements. \par -he denies any chest pain, chest pressure, diarrhea, constipation, dysphagia, dizziness, sour taste in the mouth, leg swelling, leg pain, itchy eyes, itchy ears, heartburn, reflux, myalgias or arthralgias.

## 2021-03-12 ENCOUNTER — NON-APPOINTMENT (OUTPATIENT)
Age: 71
End: 2021-03-12

## 2021-08-02 DIAGNOSIS — Z01.812 ENCOUNTER FOR PREPROCEDURAL LABORATORY EXAMINATION: ICD-10-CM

## 2021-08-26 ENCOUNTER — APPOINTMENT (OUTPATIENT)
Dept: PULMONOLOGY | Facility: CLINIC | Age: 71
End: 2021-08-26
Payer: MEDICARE

## 2021-08-26 VITALS
RESPIRATION RATE: 16 BRPM | BODY MASS INDEX: 35.44 KG/M2 | SYSTOLIC BLOOD PRESSURE: 120 MMHG | WEIGHT: 200 LBS | HEART RATE: 100 BPM | HEIGHT: 63 IN | DIASTOLIC BLOOD PRESSURE: 70 MMHG | TEMPERATURE: 97.7 F | OXYGEN SATURATION: 90 %

## 2021-08-26 PROCEDURE — ZZZZZ: CPT

## 2021-08-26 PROCEDURE — 99406 BEHAV CHNG SMOKING 3-10 MIN: CPT | Mod: 25

## 2021-08-26 PROCEDURE — 99214 OFFICE O/P EST MOD 30 MIN: CPT | Mod: 25

## 2021-08-26 RX ORDER — FLUTICASONE PROPIONATE 220 UG/1
220 AEROSOL, METERED RESPIRATORY (INHALATION) TWICE DAILY
Qty: 3 | Refills: 1 | Status: DISCONTINUED | COMMUNITY
Start: 2020-09-10 | End: 2021-08-26

## 2021-08-26 NOTE — PROCEDURE
[FreeTextEntry1] : CT chest 3/3/2021) reveals stable CT chest with emphysema, airway disease, and a few stable subcentimeter noncalcified upper lobe pulmonary nodules measuring up to 3mm. There is more than 3 year stability. There is no new pulmonary nodule, thoracic lymphadenopathy or actibve pulmonary parenchymal disease. \par

## 2021-08-26 NOTE — HISTORY OF PRESENT ILLNESS
[FreeTextEntry1] : Mr. Ann is a 70 year old male presenting to the office today for a follow up visit for \par abnormal chest CT, COPD, nicotine addiction, and non-allergic rhinitis. His chief complaint is odd sleep pattern\par -he notes he feels alright\par -he notes feeling like he gets less and less sleep \par -he notes low energy levels \par -he denies visual issues\par -he notes his right leg is "shot" and give him pain \par -he notes congestion in his sinuses\par -no new medications, vitamins, or supplements \par -he notes having lost a lot of weight but is slowly regaining the weight \par -s/p covid-19 vaccine x2\par -he denies recent travel \par -he notes not knowing why he doesn’t get enough sleep \par -he notes still smoking \par patient denies any headaches, nausea, vomiting, fever, chills, sweats, chest pain, chest pressure, palpitations, coughing, wheezing, fatigue, diarrhea, constipation, dysphagia, myalgias, dizziness, leg swelling, leg pain, itchy eyes, itchy ears, heartburn, reflux or sour taste in the mouth

## 2021-08-26 NOTE — ADDENDUM
[FreeTextEntry1] : Documented by Julianna Voss acting as a scribe for Dr. Steven Renee on (08/26/2021).\par \par All medical record entries made by the Scribe were at my, Dr. Steven Renee's, direction and personally dictated by me on (08/26/2021). I have reviewed the chart and agree that the record accurately reflects my personal performance of the history, physical exam, assessment and plan. I have also personally directed, reviewed, and agree with the discharge instructions.\par

## 2021-08-26 NOTE — PHYSICAL EXAM
[No Acute Distress] : no acute distress [Normal Oropharynx] : normal oropharynx [Normal Appearance] : normal appearance [No Neck Mass] : no neck mass [Normal Rate/Rhythm] : normal rate/rhythm [Normal S1, S2] : normal s1, s2 [No Murmurs] : no murmurs [No Resp Distress] : no resp distress [Clear to Auscultation Bilaterally] : clear to auscultation bilaterally [Benign] : benign [Normal Gait] : normal gait [No Clubbing] : no clubbing [No Cyanosis] : no cyanosis [No Edema] : no edema [FROM] : FROM [Normal Color/ Pigmentation] : normal color/ pigmentation [No Focal Deficits] : no focal deficits [Oriented x3] : oriented x3 [Normal Affect] : normal affect [III] : Mallampati Class: III [Kyphosis] : kyphosis [TextBox_2] : ow  [TextBox_68] : I:E 1:3; mild expiratory wheeze

## 2021-08-26 NOTE — ASSESSMENT
[FreeTextEntry1] : Mr. Colón is a 70 year old male with a history of COPD, overweight, OSAS, allergy, CVA, HTN, and nicotine addiction presenting to the office today for a follow up visit. He is currently stable from a pulmonary perspective. (stable) , poor sleep #1\par \par His shortness of breath is multifactorial due to:\par -COPD\par -obesity\par -poor breathing mechanics related to poor balance \par -potential underlying cardiac disease\par \par problem 1: COPD (stable)\par - Recommended the use of Aerobika to help bring up mucus \par -continue to use Anoro 1 inhalation daily\par -continue to use Asmanex 2 inhalations BID\par -continue to use Combivent as a rescue inhaler\par \par -COPD is a progressive disease and although it can’t be cured , appropriate management can slow its progression, reduce frequency and severity of exacerbations, and improve symptoms and the patient quality of life. Hospitalizations are the greatest contributor to the total COPD costs and account for up to 87% of total COPD related costs. Exacerbations are the main cause of admissions and subsequently account for the 40-75% of COPD costs. Inhaled maintenance therapy reduces the incidence of exacerbations in patients with stable COPD. Incorrect inhaler use and nonadherence are major obstacles to achieving COPD treatment goals. Many COPD patients have challenges (impaired inhalation, limited dexterity, reduced cognition: that limit their ability to correctly use their COPD treatment devices resulting in reduced symptom control. Of most importance is smoking cessation and early intervention with respiratory illnesses and contemplation for pulmonary rehab to enhance quality of life. \par -Inhaler technique reviewed as well as oral hygiene techniques reviewed with patient. Avoidance of cold air, extremes of temperature, rescue inhaler should be used before exercise. Order of medication reviewed with patient. Recommended use of a cool mist humidifier in the bedroom. \par \par problem 2: allergies and sinuses:\par -continue Flonase 1 sniff each nostril BID \par -continue to use Olopatadine 1 sniff/nostril BID (.6)\par \par -Environmental measures for allergies were encouraged including mattress and pillow cover, air purifier, and environmental controls. \par \par problem 3: nonallergic rhinitis:\par -continue Atrovent (0.6%) nasal spray 1 sniff each nostril up to TID\par \par problem 4: history of abnormal chest CT / Lung Cancer Screening\par -He was dismissed by Dr. Pieter Solis\par -He should have a follow up low dose chest CT 10/2020, next 3/2022 \par -Discussed with patient about the lung cancer screening study (with Ele[facility]), and is being set up for the study\par \par CAT scans are the only radiological modality to identify abnormalities w/in the lungs with regards to nodules/masses/lymph nodes. Risks, benefits were reviewed in detail. The guidelines for abnormalities include follow up CT scans at various intervals which could range from 6 weeks to 1 year intervals. If there is a change for the worse then consideration for a biopsy will be considered if you are a candidate. Second opinion evaluation with a thoracic surgeon or an interventional radiologist could be offered. \par \par problem 5: smoking and nicotine addiction ( 5/26/2021 )\par -He is being prescribed Wellbutrin \par -Discussed the risks/associations with continued smoking including COPD, emphysema, shortness of breath, renal cancer, bladder cancer, stroke risk, cardiac disease, etc. Smoking cessation was discussed at length and highly encouraged. Various options to aid cessation was discussed including use of Chantix, Nicotrol, nicotine products, laser therapy, hypnosis, Wellbutrin, etc. \par \par problem 6: obesity \par -Weight loss, exercise, and diet control were discussed and are highly encouraged. Treatment options were given such as, aqua therapy, and contacting a nutritionist. Recommended to use the elliptical, stationary bike, less use of treadmill.  Obesity is associated with worsening asthma, shortness of breath, and potential for cardiac disease, diabetes, and other underlying medical conditions.\par \par problem 7: ? JESS (no interest)\par -based on his fatigue, EDS, snoring, elevated mallampati class he is being set up for an at home sleep study\par \par -Sleep apnea is associated with adverse clinical consequences which an affect most organ systems.  Cardiovascular disease risk includes arrhythmias, atrial fibrillation, hypertension, coronary artery disease, and stroke. Metabolic disorders include diabetes type 2, non-alcoholic fatty liver disease. Mood disorder especially depression; and cognitive decline especially in the elderly. Associations with  chronic reflux/Cortés’s esophagus some but not all inclusive. \par -Reasons to assess this include arousal consistent with hypopnea; respiratory events most prominent in REM sleep or supine position; therefore sleep staging and body position are important for accurate diagnosis and estimation of AHI. \par \par problem 8: Health Maintenance/COVID19 Precautions:\par -S/p Covid vaccine 2/2021\par - Clean your hands often. Wash your hands often with soap and water for at least 20 seconds, especially after blowing your nose, coughing, or sneezing, or having been in a public place.\par - If soap and water are not available, use a hand  that contains at least 60% alcohol.\par - To the extent possible, avoid touching high-touch surfaces in public places - elevator buttons, door handles, handrails, handshaking with people, etc. Use a tissue or your sleeve to cover your hand or finger if you must touch something.\par - Wash your hands after touching surfaces in public places.\par - Avoid touching your face, nose, eyes, etc.\par - Clean and disinfect your home to remove germs: practice routine cleaning of frequently touched surfaces (for example: tables, doorknobs, light switches, handles, desks, toilets, faucets, sinks & cell phones)\par - Avoid crowds, especially in poorly ventilated spaces. Your risk of exposure to respiratory viruses like COVID-19 may increase in crowded, closed-in settings with little air circulation if there are people in the crowd who are sick. All patients are recommended to practice social distancing and stay at least 6 feet away from others.\par - Avoid all non-essential travel including plane trips, and especially avoid embarking on cruise ships.\par -If COVID-19 is spreading in your community, take extra measures to put distance between yourself and other people to further reduce your risk of being exposed to this new virus.\par -Stay home as much as possible.\par - Consider ways of getting food brought to your house through family, social, or commercial networks\par -Be aware that the virus has been known to live in the air up to 3 hours post exposure, cardboard up to 24 hours post exposure, copper up to 4 hours post exposure, steel and plastic up to 2-3 days post exposure. Risk of transmission from these surfaces are affected by many variables.\par Immune Support Recommendations:\par -OTC Vitamin C 500mg BID \par -OTC Quercetin 250-500mg BID \par -OTC Zinc 75-100mg per day \par -OTC Melatonin 1 or 2 mg a night \par -OTC Vitamin D 1-4000mg per day \par -OTC Tonic Water 8oz per day\par Asthma and COVID19:\par You need to make sure your asthma is under control. This often requires the use of inhaled corticosteroids (and sometimes oral corticosteroids). Inhaled corticosteroids do not likely reduce your immune system’s ability to fight infections, but oral corticosteroids may. It is important to use the steps above to protect yourself to limit your exposure to any respiratory virus. \par \par problem 9: health maintenance \par -recommended yearly flu shot after October 15\par -recommended strep pneumonia vaccines: Prevnar-13 vaccine, followed by Pneumo vaccine 23 one year following (2/2020)\par -recommended early intervention for URIs\par -recommended regular osteoporosis evaluations\par -recommended early dermatological evaluations\par -recommended after the age of 50 to the age of 70, colonoscopy every 5 years \par \par He should follow up in 6 months.\par  He is encouraged to call with any changes, concerns, or questions. \par \par

## 2021-11-18 ENCOUNTER — NON-APPOINTMENT (OUTPATIENT)
Age: 71
End: 2021-11-18

## 2022-02-10 ENCOUNTER — APPOINTMENT (OUTPATIENT)
Dept: PULMONOLOGY | Facility: CLINIC | Age: 72
End: 2022-02-10

## 2022-04-05 ENCOUNTER — NON-APPOINTMENT (OUTPATIENT)
Age: 72
End: 2022-04-05

## 2022-04-06 ENCOUNTER — APPOINTMENT (OUTPATIENT)
Dept: PULMONOLOGY | Facility: CLINIC | Age: 72
End: 2022-04-06
Payer: MEDICARE

## 2022-04-06 VITALS
DIASTOLIC BLOOD PRESSURE: 80 MMHG | SYSTOLIC BLOOD PRESSURE: 130 MMHG | TEMPERATURE: 97.3 F | RESPIRATION RATE: 16 BRPM | OXYGEN SATURATION: 95 % | WEIGHT: 204 LBS | HEIGHT: 61 IN | HEART RATE: 78 BPM | BODY MASS INDEX: 38.51 KG/M2

## 2022-04-06 PROCEDURE — 95012 NITRIC OXIDE EXP GAS DETER: CPT

## 2022-04-06 PROCEDURE — ZZZZZ: CPT

## 2022-04-06 PROCEDURE — 94729 DIFFUSING CAPACITY: CPT

## 2022-04-06 PROCEDURE — 94010 BREATHING CAPACITY TEST: CPT

## 2022-04-06 PROCEDURE — 94727 GAS DIL/WSHOT DETER LNG VOL: CPT

## 2022-04-06 PROCEDURE — 99214 OFFICE O/P EST MOD 30 MIN: CPT | Mod: 25

## 2022-04-06 PROCEDURE — 99406 BEHAV CHNG SMOKING 3-10 MIN: CPT | Mod: 25

## 2022-04-06 NOTE — ASSESSMENT
[FreeTextEntry1] : Mr. Colón is a 71 year old male with a history of COPD, overweight, OSAS, allergy, CVA, HTN, and nicotine addiction presenting to the office today for a follow up visit. He is currently stable from a pulmonary perspective. (stable) , poor sleep #1 (still), still smoking\par \par His shortness of breath is multifactorial due to:\par -COPD\par -obesity\par -poor breathing mechanics related to poor balance \par -potential underlying cardiac disease\par \par problem 1: COPD (stable)\par - Recommended the use of Aerobika to help bring up mucus \par -change Anoro 1 inhalation daily and Pulmicort 2 inhalations BID  to Trelegy 100 1 inhalation qD \par -continue to use Asmanex 2 inhalations BID\par -continue to use Combivent as a rescue inhaler\par -COPD is a progressive disease and although it can’t be cured , appropriate management can slow its progression, reduce frequency and severity of exacerbations, and improve symptoms and the patient quality of life. Hospitalizations are the greatest contributor to the total COPD costs and account for up to 87% of total COPD related costs. Exacerbations are the main cause of admissions and subsequently account for the 40-75% of COPD costs. Inhaled maintenance therapy reduces the incidence of exacerbations in patients with stable COPD. Incorrect inhaler use and nonadherence are major obstacles to achieving COPD treatment goals. Many COPD patients have challenges (impaired inhalation, limited dexterity, reduced cognition: that limit their ability to correctly use their COPD treatment devices resulting in reduced symptom control. Of most importance is smoking cessation and early intervention with respiratory illnesses and contemplation for pulmonary rehab to enhance quality of life. \par -Inhaler technique reviewed as well as oral hygiene techniques reviewed with patient. Avoidance of cold air, extremes of temperature, rescue inhaler should be used before exercise. Order of medication reviewed with patient. Recommended use of a cool mist humidifier in the bedroom. \par \par problem 2: allergies and sinuses:\par -continue Flonase 1 sniff each nostril BID \par -continue to use Olopatadine 1 sniff/nostril BID (.6)\par \par -Environmental measures for allergies were encouraged including mattress and pillow cover, air purifier, and environmental controls. \par \par problem 3: nonallergic rhinitis:\par -continue Atrovent (0.6%) nasal spray 1 sniff each nostril up to TID\par \par problem 4: history of abnormal chest CT / Lung Cancer Screening - due\par -He was dismissed by Dr. Pieter Solis\par -He should have a follow up low dose chest CT 10/2020, next 3/2022 \par -Discussed with patient about the lung cancer screening study (with Dez Marion), and is being set up for the study\par \par CAT scans are the only radiological modality to identify abnormalities w/in the lungs with regards to nodules/masses/lymph nodes. Risks, benefits were reviewed in detail. The guidelines for abnormalities include follow up CT scans at various intervals which could range from 6 weeks to 1 year intervals. If there is a change for the worse then consideration for a biopsy will be considered if you are a candidate. Second opinion evaluation with a thoracic surgeon or an interventional radiologist could be offered. \par \par problem 5: smoking and nicotine addiction (4/6/2022)\par -He is being prescribed Wellbutrin \par -Discussed the risks/associations with continued smoking including COPD, emphysema, shortness of breath, renal cancer, bladder cancer, stroke risk, cardiac disease, etc. Smoking cessation was discussed at length and highly encouraged. Various options to aid cessation was discussed including use of Chantix, Nicotrol, nicotine products, laser therapy, hypnosis, Wellbutrin, etc. \par \par problem 6: obesity \par -Weight loss, exercise, and diet control were discussed and are highly encouraged. Treatment options were given such as, aqua therapy, and contacting a nutritionist. Recommended to use the elliptical, stationary bike, less use of treadmill.  Obesity is associated with worsening asthma, shortness of breath, and potential for cardiac disease, diabetes, and other underlying medical conditions.\par \par problem 7: ? JESS (no interest)\par -Refused Rx/Dx\par -based on his fatigue, EDS, snoring, elevated mallampati class he is being set up for an at home sleep study\par \par -Sleep apnea is associated with adverse clinical consequences which an affect most organ systems.  Cardiovascular disease risk includes arrhythmias, atrial fibrillation, hypertension, coronary artery disease, and stroke. Metabolic disorders include diabetes type 2, non-alcoholic fatty liver disease. Mood disorder especially depression; and cognitive decline especially in the elderly. Associations with  chronic reflux/Cortés’s esophagus some but not all inclusive. \par -Reasons to assess this include arousal consistent with hypopnea; respiratory events most prominent in REM sleep or supine position; therefore sleep staging and body position are important for accurate diagnosis and estimation of AHI. \par \par problem 8: Health Maintenance/COVID19 Precautions:\par -S/p Covid vaccine x3\par - Clean your hands often. Wash your hands often with soap and water for at least 20 seconds, especially after blowing your nose, coughing, or sneezing, or having been in a public place.\par - If soap and water are not available, use a hand  that contains at least 60% alcohol.\par - To the extent possible, avoid touching high-touch surfaces in public places - elevator buttons, door handles, handrails, handshaking with people, etc. Use a tissue or your sleeve to cover your hand or finger if you must touch something.\par - Wash your hands after touching surfaces in public places.\par - Avoid touching your face, nose, eyes, etc.\par - Clean and disinfect your home to remove germs: practice routine cleaning of frequently touched surfaces (for example: tables, doorknobs, light switches, handles, desks, toilets, faucets, sinks & cell phones)\par - Avoid crowds, especially in poorly ventilated spaces. Your risk of exposure to respiratory viruses like COVID-19 may increase in crowded, closed-in settings with little air circulation if there are people in the crowd who are sick. All patients are recommended to practice social distancing and stay at least 6 feet away from others.\par - Avoid all non-essential travel including plane trips, and especially avoid embarking on cruise ships.\par -If COVID-19 is spreading in your community, take extra measures to put distance between yourself and other people to further reduce your risk of being exposed to this new virus.\par -Stay home as much as possible.\par - Consider ways of getting food brought to your house through family, social, or commercial networks\par -Be aware that the virus has been known to live in the air up to 3 hours post exposure, cardboard up to 24 hours post exposure, copper up to 4 hours post exposure, steel and plastic up to 2-3 days post exposure. Risk of transmission from these surfaces are affected by many variables.\par Immune Support Recommendations:\par -OTC Vitamin C 500mg BID \par -OTC Quercetin 250-500mg BID \par -OTC Zinc 75-100mg per day \par -OTC Melatonin 1 or 2 mg a night \par -OTC Vitamin D 1-4000mg per day \par -OTC Tonic Water 8oz per day\par Asthma and COVID19:\par You need to make sure your asthma is under control. This often requires the use of inhaled corticosteroids (and sometimes oral corticosteroids). Inhaled corticosteroids do not likely reduce your immune system’s ability to fight infections, but oral corticosteroids may. It is important to use the steps above to protect yourself to limit your exposure to any respiratory virus. \par \par problem 9: health maintenance \par -recommended yearly flu shot after October 15\par -recommended strep pneumonia vaccines: Prevnar-13 vaccine, followed by Pneumo vaccine 23 one year following (2/2020)\par -recommended early intervention for URIs\par -recommended regular osteoporosis evaluations\par -recommended early dermatological evaluations\par -recommended after the age of 50 to the age of 70, colonoscopy every 5 years \par \par He should follow up in 6 months.\par  He is encouraged to call with any changes, concerns, or questions. \par \par

## 2022-04-06 NOTE — PROCEDURE
[FreeTextEntry1] : Full PFT revealed moderate-severe obstructive dysfunction, with a FEV1 of 1.13L, which is 46% of predicted, normal lung volumes, and a mildly reduced diffusion of 12.6, which is 60% of predicted, with a normal flow volume loop \par \par FENO was 11; a normal value being less than 25. Fractional exhaled nitric oxide (FENO) is regarded as a simple, noninvasive method for assessing eosinophilic airway inflammation. Produced by a variety of cells within the lung, nitric oxide (NO) concentrations are generally low in healthy individuals. However, high concentrations of NO appear to be involved in nonspecific host defense mechanisms and chronic inflammatory  diseases such as asthma. The American Thoracic Society (ATS) therefore recommended using FENO to aid in the diagnosis and monitoring of eosinophilic airway inflammation and asthma, and for identifying steroid responsive individuals whose chronic respiratory symptoms may be caused by airway inflammation

## 2022-04-06 NOTE — HISTORY OF PRESENT ILLNESS
[FreeTextEntry1] : Mr. Ann is a 71 year old male presenting to the office today for a follow up visit for \par abnormal chest CT, COPD, nicotine addiction, and non-allergic rhinitis. His chief complaint is odd sleep pattern\par -he notes feeling okay in general\par -he notes he has been getting less sleep but is unsure why\par -he notes his bowels are regular\par -he denies wheezing\par -he denies any visual issues \par -he notes he is eating well and his appetite is good\par -he notes 5 hours of sleep for him would be a good night now\par -he notes he does not think he slept last night\par -he notes no one has said he snores\par -he notes he is still smoking\par -he notes he watches TV when he goes to sleep sometimes\par -he notes he normally stays up later since he has retired\par -s/p COVID-19 vaccine x3 \par \par -patient denies any headaches, nausea, vomiting, fever, chills, sweats, chest pain, chest pressure, palpitations, coughing, wheezing, fatigue, diarrhea, constipation, dysphagia, myalgias, dizziness, leg swelling, leg pain, itchy eyes, itchy ears, heartburn, reflux or sour taste in the mouth

## 2022-04-06 NOTE — PHYSICAL EXAM
[No Acute Distress] : no acute distress [Normal Oropharynx] : normal oropharynx [III] : Mallampati Class: III [Normal Appearance] : normal appearance [No Neck Mass] : no neck mass [Normal Rate/Rhythm] : normal rate/rhythm [Normal S1, S2] : normal s1, s2 [No Murmurs] : no murmurs [No Resp Distress] : no resp distress [Clear to Auscultation Bilaterally] : clear to auscultation bilaterally [No Abnormalities] : no abnormalities [Kyphosis] : kyphosis [Benign] : benign [Normal Gait] : normal gait [No Clubbing] : no clubbing [No Cyanosis] : no cyanosis [No Edema] : no edema [FROM] : FROM [Normal Color/ Pigmentation] : normal color/ pigmentation [No Focal Deficits] : no focal deficits [Oriented x3] : oriented x3 [Normal Affect] : normal affect [TextBox_2] : ow  [TextBox_68] : I:E 1:3; very mild forced expiratory wheeze

## 2022-04-06 NOTE — ADDENDUM
[FreeTextEntry1] : Documented by Camron Liu acting as a scribe for Dr. Steven Renee on 04/06/2022\par \par All medical record entries made by the scribe were at my, Dr. Steven Renee's, direction and personally dictated by me on 04/06/2022. I have reviewed the chart and agree that the record accurately reflects my personal performance of the history, physical exam, assessment, and plan. I have also personally directed, reviewed, and agree with the discharge instructions.

## 2022-10-04 ENCOUNTER — APPOINTMENT (OUTPATIENT)
Dept: PULMONOLOGY | Facility: CLINIC | Age: 72
End: 2022-10-04

## 2022-10-04 VITALS
BODY MASS INDEX: 36.44 KG/M2 | TEMPERATURE: 97.3 F | DIASTOLIC BLOOD PRESSURE: 70 MMHG | RESPIRATION RATE: 16 BRPM | WEIGHT: 193 LBS | HEIGHT: 61 IN | HEART RATE: 73 BPM | OXYGEN SATURATION: 93 % | SYSTOLIC BLOOD PRESSURE: 114 MMHG

## 2022-10-04 DIAGNOSIS — Z71.89 OTHER SPECIFIED COUNSELING: ICD-10-CM

## 2022-10-04 PROCEDURE — 94729 DIFFUSING CAPACITY: CPT

## 2022-10-04 PROCEDURE — 99214 OFFICE O/P EST MOD 30 MIN: CPT | Mod: 25

## 2022-10-04 PROCEDURE — 94010 BREATHING CAPACITY TEST: CPT

## 2022-10-04 PROCEDURE — ZZZZZ: CPT

## 2022-10-04 PROCEDURE — 95012 NITRIC OXIDE EXP GAS DETER: CPT | Mod: 59

## 2022-10-04 PROCEDURE — 94727 GAS DIL/WSHOT DETER LNG VOL: CPT

## 2022-10-04 NOTE — PROCEDURE
[FreeTextEntry1] : Full PFT reveals moderate-severe obstructive dysfunction; FEV1 was 1.35L which is 51% of predicted;  mildly reduced diffusion at 13.1, which is 63% of predicted; normal flow volume loop.\par \par FENO was 10; a normal value being less than 25\par Fractional exhaled nitric oxide (FENO) is regarded as a simple, noninvasive method for assessing eosinophilic airway inflammation. Produced by a variety of cells within the lung, nitric oxide (NO) concentrations are generally low in healthy individuals. However, high concentrations of NO appear to be involved in nonspecific host defense mechanisms and chronic inflammatory diseases such as asthma. The American Thoracic Society (ATS) therefore has recommended using FENO to aid in the diagnosis and monitoring of eosinophilic airway inflammation and asthma, and for identifying steroid responsive individuals whose chronic respiratory symptoms may be caused by airway inflammation.

## 2022-10-04 NOTE — PHYSICAL EXAM
[No Acute Distress] : no acute distress [Normal Oropharynx] : normal oropharynx [III] : Mallampati Class: III [Normal Appearance] : normal appearance [No Neck Mass] : no neck mass [Normal Rate/Rhythm] : normal rate/rhythm [Normal S1, S2] : normal s1, s2 [No Murmurs] : no murmurs [No Resp Distress] : no resp distress [Clear to Auscultation Bilaterally] : clear to auscultation bilaterally [No Abnormalities] : no abnormalities [Kyphosis] : kyphosis [Benign] : benign [Normal Gait] : normal gait [No Clubbing] : no clubbing [No Cyanosis] : no cyanosis [No Edema] : no edema [FROM] : FROM [Normal Color/ Pigmentation] : normal color/ pigmentation [No Focal Deficits] : no focal deficits [Oriented x3] : oriented x3 [Normal Affect] : normal affect [TextBox_2] : ow  [TextBox_68] : I:E ratio 1:3; mild expiratory wheezes b/l

## 2022-10-04 NOTE — HISTORY OF PRESENT ILLNESS
[FreeTextEntry1] : Mr. Ann is a 71 year old male presenting to the office today for a follow up visit for \par abnormal chest CT, COPD, nicotine addiction, and non-allergic rhinitis. His chief complaint is \par \par -he notes at baseline\par -he notes bowels are good and regular \par -he notes ESCAMILLA on stairs\par -he notes losing weight \par -he notes mild PND\par -he notes vision is stable \par -he notes balance is stable\par -he notes feeling a little dizzy\par -he notes still smoking, though slightly decreased\par \par -he denies any headaches, nausea, vomiting, fever, chills, sweats, chest pain, chest pressure, coughing, wheezing, palpitations, constipation, diarrhea, dysphagia, heartburn, reflux, itchy eyes, itchy ears, leg swelling, leg pain, arthralgias, myalgias, or sour taste in the mouth.

## 2022-10-04 NOTE — ASSESSMENT
[FreeTextEntry1] : Mr. Colón is a 71 year old male with a history of COPD, overweight, OSAS, allergy, CVA, HTN, and nicotine addiction presenting to the office today for a follow up visit. He is currently stable from a pulmonary perspective. (stable) , poor sleep #1 (still), still smoking- purposeful weight loss\par \par His shortness of breath is multifactorial due to:\par -COPD\par -obesity\par -poor breathing mechanics related to poor balance \par -potential underlying cardiac disease\par \par problem 1: COPD (stable)\par - Recommended the use of Aerobika to help bring up mucus \par -change Anoro 1 inhalation daily and Pulmicort 2 inhalations BID  to Trelegy 200 1 inhalation qD \par -continue to use Asmanex 2 inhalations BID\par -continue to use Combivent as a rescue inhaler\par -COPD is a progressive disease and although it can’t be cured , appropriate management can slow its progression, reduce frequency and severity of exacerbations, and improve symptoms and the patient quality of life. Hospitalizations are the greatest contributor to the total COPD costs and account for up to 87% of total COPD related costs. Exacerbations are the main cause of admissions and subsequently account for the 40-75% of COPD costs. Inhaled maintenance therapy reduces the incidence of exacerbations in patients with stable COPD. Incorrect inhaler use and nonadherence are major obstacles to achieving COPD treatment goals. Many COPD patients have challenges (impaired inhalation, limited dexterity, reduced cognition: that limit their ability to correctly use their COPD treatment devices resulting in reduced symptom control. Of most importance is smoking cessation and early intervention with respiratory illnesses and contemplation for pulmonary rehab to enhance quality of life. \par -Inhaler technique reviewed as well as oral hygiene techniques reviewed with patient. Avoidance of cold air, extremes of temperature, rescue inhaler should be used before exercise. Order of medication reviewed with patient. Recommended use of a cool mist humidifier in the bedroom. \par \par problem 2: allergies and sinuses:\par -continue Flonase 1 sniff each nostril BID \par -continue to use Olopatadine 1 sniff/nostril BID (.6)\par \par -Environmental measures for allergies were encouraged including mattress and pillow cover, air purifier, and environmental controls. \par \par problem 3: nonallergic rhinitis:\par -continue Atrovent (0.6%) nasal spray 1 sniff each nostril up to TID\par \par problem 4: history of abnormal chest CT / Lung Cancer Screening - overdue 10/2022\par -He was dismissed by Dr. Pieter Solis\par -He should have a follow up low dose chest CT 10/2020, next 3/2022 \par -Discussed with patient about the lung cancer screening study (with Dez Marion), and is being set up for the study\par \par CAT scans are the only radiological modality to identify abnormalities w/in the lungs with regards to nodules/masses/lymph nodes. Risks, benefits were reviewed in detail. The guidelines for abnormalities include follow up CT scans at various intervals which could range from 6 weeks to 1 year intervals. If there is a change for the worse then consideration for a biopsy will be considered if you are a candidate. Second opinion evaluation with a thoracic surgeon or an interventional radiologist could be offered. \par \par problem 5: smoking and nicotine addiction (10/4/2022)\par -He is being prescribed Wellbutrin \par -Discussed the risks/associations with continued smoking including COPD, emphysema, shortness of breath, renal cancer, bladder cancer, stroke risk, cardiac disease, etc. Smoking cessation was discussed at length and highly encouraged. Various options to aid cessation was discussed including use of Chantix, Nicotrol, nicotine products, laser therapy, hypnosis, Wellbutrin, etc. \par \par problem 6: obesity (in progress)\par -Weight loss, exercise, and diet control were discussed and are highly encouraged. Treatment options were given such as, aqua therapy, and contacting a nutritionist. Recommended to use the elliptical, stationary bike, less use of treadmill.  Obesity is associated with worsening asthma, shortness of breath, and potential for cardiac disease, diabetes, and other underlying medical conditions.\par \par problem 7: ? JESS (no interest)\par -Refused Rx/Dx\par -based on his fatigue, EDS, snoring, elevated mallampati class he is being set up for an at home sleep study\par \par -Sleep apnea is associated with adverse clinical consequences which an affect most organ systems.  Cardiovascular disease risk includes arrhythmias, atrial fibrillation, hypertension, coronary artery disease, and stroke. Metabolic disorders include diabetes type 2, non-alcoholic fatty liver disease. Mood disorder especially depression; and cognitive decline especially in the elderly. Associations with  chronic reflux/Cortés’s esophagus some but not all inclusive. \par -Reasons to assess this include arousal consistent with hypopnea; respiratory events most prominent in REM sleep or supine position; therefore sleep staging and body position are important for accurate diagnosis and estimation of AHI. \par \par problem 8: Health Maintenance/COVID19 Precautions:\par -recommended SaNOtize nasal spray \par -S/p Covid vaccine x3\par - Clean your hands often. Wash your hands often with soap and water for at least 20 seconds, especially after blowing your nose, coughing, or sneezing, or having been in a public place.\par - If soap and water are not available, use a hand  that contains at least 60% alcohol.\par - To the extent possible, avoid touching high-touch surfaces in public places - elevator buttons, door handles, handrails, handshaking with people, etc. Use a tissue or your sleeve to cover your hand or finger if you must touch something.\par - Wash your hands after touching surfaces in public places.\par - Avoid touching your face, nose, eyes, etc.\par - Clean and disinfect your home to remove germs: practice routine cleaning of frequently touched surfaces (for example: tables, doorknobs, light switches, handles, desks, toilets, faucets, sinks & cell phones)\par - Avoid crowds, especially in poorly ventilated spaces. Your risk of exposure to respiratory viruses like COVID-19 may increase in crowded, closed-in settings with little air circulation if there are people in the crowd who are sick. All patients are recommended to practice social distancing and stay at least 6 feet away from others.\par - Avoid all non-essential travel including plane trips, and especially avoid embarking on cruise ships.\par -If COVID-19 is spreading in your community, take extra measures to put distance between yourself and other people to further reduce your risk of being exposed to this new virus.\par -Stay home as much as possible.\par - Consider ways of getting food brought to your house through family, social, or commercial networks\par -Be aware that the virus has been known to live in the air up to 3 hours post exposure, cardboard up to 24 hours post exposure, copper up to 4 hours post exposure, steel and plastic up to 2-3 days post exposure. Risk of transmission from these surfaces are affected by many variables.\par Immune Support Recommendations:\par -OTC Vitamin C 500mg BID \par -OTC Quercetin 250-500mg BID \par -OTC Zinc 75-100mg per day \par -OTC Melatonin 1 or 2 mg a night \par -OTC Vitamin D 1-4000mg per day \par -OTC Tonic Water 8oz per day\par Asthma and COVID19:\par You need to make sure your asthma is under control. This often requires the use of inhaled corticosteroids (and sometimes oral corticosteroids). Inhaled corticosteroids do not likely reduce your immune system’s ability to fight infections, but oral corticosteroids may. It is important to use the steps above to protect yourself to limit your exposure to any respiratory virus. \par \par problem 9: health maintenance \par -recommended yearly flu shot after October 15 s/p 2021\par -recommended strep pneumonia vaccines: Prevnar-13 vaccine, followed by Pneumo vaccine 23 one year following (2/2020)\par -recommended early intervention for URIs\par -recommended regular osteoporosis evaluations\par -recommended early dermatological evaluations\par -recommended after the age of 50 to the age of 70, colonoscopy every 5 years \par \par He should follow up in 6 months.\par  He is encouraged to call with any changes, concerns, or questions. \par \par

## 2022-10-04 NOTE — ADDENDUM
[FreeTextEntry1] : Documented by ELISEO Norris acting as a scribe for Dr. Steven Renee on 10/04/2022 .\par All medical record entries made by the Scribe were at my, Dr. Steven Renee's, direction and personally dictated by me on 10/04/2022. I have reviewed the chart and agree that the record accurately reflects my personal performance of the history, physical exam, assessment and plan. I have also personally directed, reviewed, and agree with the discharge instructions.

## 2023-04-11 ENCOUNTER — NON-APPOINTMENT (OUTPATIENT)
Age: 73
End: 2023-04-11

## 2023-04-11 ENCOUNTER — APPOINTMENT (OUTPATIENT)
Dept: PULMONOLOGY | Facility: CLINIC | Age: 73
End: 2023-04-11
Payer: MEDICARE

## 2023-04-11 VITALS
DIASTOLIC BLOOD PRESSURE: 70 MMHG | OXYGEN SATURATION: 91 % | WEIGHT: 185 LBS | TEMPERATURE: 97.6 F | RESPIRATION RATE: 17 BRPM | HEART RATE: 69 BPM | HEIGHT: 61 IN | SYSTOLIC BLOOD PRESSURE: 120 MMHG | BODY MASS INDEX: 34.93 KG/M2

## 2023-04-11 PROCEDURE — 94727 GAS DIL/WSHOT DETER LNG VOL: CPT

## 2023-04-11 PROCEDURE — 99214 OFFICE O/P EST MOD 30 MIN: CPT | Mod: 25

## 2023-04-11 PROCEDURE — 94010 BREATHING CAPACITY TEST: CPT

## 2023-04-11 PROCEDURE — 95012 NITRIC OXIDE EXP GAS DETER: CPT

## 2023-04-11 PROCEDURE — 94729 DIFFUSING CAPACITY: CPT

## 2023-04-11 NOTE — ASSESSMENT
[FreeTextEntry1] : Mr. Colón is a 72 year old male with a history of COPD, overweight, OSAS, allergy, CVA, HTN, and nicotine addiction presenting to the office today for a follow up visit. He is currently stable from a pulmonary perspective. (stable) , poor sleep #1 (still), still smoking- purposeful weight loss; mild SOB\par \par His shortness of breath is multifactorial due to:\par -COPD\par -obesity\par -poor breathing mechanics related to poor balance \par -potential underlying cardiac disease\par \par problem 1: COPD (stable)\par - Recommended the use of Aerobika to help bring up mucus \par -change Anoro 1 inhalation daily and Pulmicort 2 inhalations BID  to Trelegy 200 1 inhalation qD \par -continue to use Asmanex 2 inhalations BID\par -continue to use Combivent as a rescue inhaler\par -COPD is a progressive disease and although it can’t be cured , appropriate management can slow its progression, reduce frequency and severity of exacerbations, and improve symptoms and the patient quality of life. Hospitalizations are the greatest contributor to the total COPD costs and account for up to 87% of total COPD related costs. Exacerbations are the main cause of admissions and subsequently account for the 40-75% of COPD costs. Inhaled maintenance therapy reduces the incidence of exacerbations in patients with stable COPD. Incorrect inhaler use and nonadherence are major obstacles to achieving COPD treatment goals. Many COPD patients have challenges (impaired inhalation, limited dexterity, reduced cognition: that limit their ability to correctly use their COPD treatment devices resulting in reduced symptom control. Of most importance is smoking cessation and early intervention with respiratory illnesses and contemplation for pulmonary rehab to enhance quality of life. \par -Inhaler technique reviewed as well as oral hygiene techniques reviewed with patient. Avoidance of cold air, extremes of temperature, rescue inhaler should be used before exercise. Order of medication reviewed with patient. Recommended use of a cool mist humidifier in the bedroom. \par \par Problem 1A: Cardiac\par -f/p discussed with patient \par \par problem 2: allergies and sinuses (quiet)\par -continue Flonase 1 sniff each nostril BID \par -continue to use Olopatadine 1 sniff/nostril BID (.6)\par \par -Environmental measures for allergies were encouraged including mattress and pillow cover, air purifier, and environmental controls. \par \par problem 3: nonallergic rhinitis:\par -continue Atrovent (0.6%) nasal spray 1 sniff each nostril up to TID\par \par problem 4: history of abnormal chest CT / Lung Cancer Screening - overdue 10/2022\par -He was dismissed by Dr. Pieter Solis\par -He should have a follow up low dose chest CT 10/2020, next 3/2022 ([place])\par -Discussed with patient about the lung cancer screening study (with Dez Marion), and is being set up for the study\par \par CAT scans are the only radiological modality to identify abnormalities w/in the lungs with regards to nodules/masses/lymph nodes. Risks, benefits were reviewed in detail. The guidelines for abnormalities include follow up CT scans at various intervals which could range from 6 weeks to 1 year intervals. If there is a change for the worse then consideration for a biopsy will be considered if you are a candidate. Second opinion evaluation with a thoracic surgeon or an interventional radiologist could be offered. \par \par problem 5: smoking and nicotine addiction (d/w pt: 04/11/2023 )\par -He is being prescribed Wellbutrin \par -Discussed the risks/associations with continued smoking including COPD, emphysema, shortness of breath, renal cancer, bladder cancer, stroke risk, cardiac disease, etc. Smoking cessation was discussed at length and highly encouraged. Various options to aid cessation was discussed including use of Chantix, Nicotrol, nicotine products, laser therapy, hypnosis, Wellbutrin, etc. \par \par problem 6: obesity (in progress)\par -Weight loss, exercise, and diet control were discussed and are highly encouraged. Treatment options were given such as, aqua therapy, and contacting a nutritionist. Recommended to use the elliptical, stationary bike, less use of treadmill.  Obesity is associated with worsening asthma, shortness of breath, and potential for cardiac disease, diabetes, and other underlying medical conditions.\par \par problem 7: ? JESS (no interest)\par -Refused Rx/Dx\par -based on his fatigue, EDS, snoring, elevated mallampati class he is being set up for an at home sleep study\par \par -Sleep apnea is associated with adverse clinical consequences which an affect most organ systems.  Cardiovascular disease risk includes arrhythmias, atrial fibrillation, hypertension, coronary artery disease, and stroke. Metabolic disorders include diabetes type 2, non-alcoholic fatty liver disease. Mood disorder especially depression; and cognitive decline especially in the elderly. Associations with  chronic reflux/Cortés’s esophagus some but not all inclusive. \par -Reasons to assess this include arousal consistent with hypopnea; respiratory events most prominent in REM sleep or supine position; therefore sleep staging and body position are important for accurate diagnosis and estimation of AHI. \par \par problem 8: Health Maintenance/COVID19 Precautions:\par -recommended SaNOtize nasal spray \par -S/p Covid vaccine x3\par - Clean your hands often. Wash your hands often with soap and water for at least 20 seconds, especially after blowing your nose, coughing, or sneezing, or having been in a public place.\par - If soap and water are not available, use a hand  that contains at least 60% alcohol.\par - To the extent possible, avoid touching high-touch surfaces in public places - elevator buttons, door handles, handrails, handshaking with people, etc. Use a tissue or your sleeve to cover your hand or finger if you must touch something.\par - Wash your hands after touching surfaces in public places.\par - Avoid touching your face, nose, eyes, etc.\par - Clean and disinfect your home to remove germs: practice routine cleaning of frequently touched surfaces (for example: tables, doorknobs, light switches, handles, desks, toilets, faucets, sinks & cell phones)\par - Avoid crowds, especially in poorly ventilated spaces. Your risk of exposure to respiratory viruses like COVID-19 may increase in crowded, closed-in settings with little air circulation if there are people in the crowd who are sick. All patients are recommended to practice social distancing and stay at least 6 feet away from others.\par - Avoid all non-essential travel including plane trips, and especially avoid embarking on cruise ships.\par -If COVID-19 is spreading in your community, take extra measures to put distance between yourself and other people to further reduce your risk of being exposed to this new virus.\par -Stay home as much as possible.\par - Consider ways of getting food brought to your house through family, social, or commercial networks\par -Be aware that the virus has been known to live in the air up to 3 hours post exposure, cardboard up to 24 hours post exposure, copper up to 4 hours post exposure, steel and plastic up to 2-3 days post exposure. Risk of transmission from these surfaces are affected by many variables.\par Immune Support Recommendations:\par -OTC Vitamin C 500mg BID \par -OTC Quercetin 250-500mg BID \par -OTC Zinc 75-100mg per day \par -OTC Melatonin 1 or 2 mg a night \par -OTC Vitamin D 1-4000mg per day \par -OTC Tonic Water 8oz per day\par Asthma and COVID19:\par You need to make sure your asthma is under control. This often requires the use of inhaled corticosteroids (and sometimes oral corticosteroids). Inhaled corticosteroids do not likely reduce your immune system’s ability to fight infections, but oral corticosteroids may. It is important to use the steps above to protect yourself to limit your exposure to any respiratory virus. \par \par problem 9: health maintenance \par -recommended yearly flu shot after October 15 s/p 2021\par -recommended strep pneumonia vaccines: Prevnar-13 vaccine, followed by Pneumo vaccine 23 one year following (2/2020)\par -recommended early intervention for URIs\par -recommended regular osteoporosis evaluations\par -recommended early dermatological evaluations\par -recommended after the age of 50 to the age of 70, colonoscopy every 5 years \par \par He should follow up in 6 months.\par  He is encouraged to call with any changes, concerns, or questions. \par \par

## 2023-04-11 NOTE — ADDENDUM
[FreeTextEntry1] : Documented by ELISEO Norris acting as a scribe for Dr. Steven Renee on 04/11/2023 .\par \par All medical record entries made by the Scribe were at my, Dr. Steven Renee's, direction and personally dictated by me on 04/11/2023. I have reviewed the chart and agree that the record accurately reflects my personal performance of the history, physical exam, assessment and plan. I have also personally directed, reviewed, and agree with the discharge instructions.

## 2023-04-11 NOTE — PROCEDURE
[FreeTextEntry1] : PFTs were performed to evaluate for COPD/SOB\par \par Full PFT reveals moderate-severe obstructive dysfunction ; FEV1 was 1.27 L which is 51% of predicted; moderate lung volumes; normal diffusion at 11.2, which is 56% of predicted; normal flow volume loop. \par \par FENO was 8; a normal value being less than 25\par Fractional exhaled nitric oxide (FENO) is regarded as a simple, noninvasive method for assessing eosinophilic airway inflammation. Produced by a variety of cells within the lung, nitric oxide (NO) concentrations are generally low in healthy individuals. However, high concentrations of NO appear to be involved in nonspecific host defense mechanisms and chronic inflammatory diseases such as asthma. The American Thor[employer] Society (ATS) therefore has recommended using FENO to aid in the diagnosis and monitoring of eosinophilic airway inflammation and asthma, and for identifying steroid responsive individuals whose chronic respiratory symptoms may be caused by airway inflammation.

## 2023-04-11 NOTE — HISTORY OF PRESENT ILLNESS
[FreeTextEntry1] : Mr. Ann is a 72 year old male presenting to the office today for a follow up visit for \par abnormal chest CT, COPD, nicotine addiction, and non-allergic rhinitis. His chief complaint is \par \par -he notes feeling generally well \par -he notes bowels are regular \par -he notes poor quality of sleep \par -he notes sleeping for 4hrs\par -he notes vision is stable \par -he denies dysphonia \par -he notes he desires better mobility \par -he notes mild dyspnea\par \par -he denies any headaches, nausea, emesis, fever, chills, sweats, chest pain, chest pressure, coughing, wheezing, palpitations, constipation, diarrhea, vertigo, dysphagia, heartburn, reflux, itchy eyes, itchy ears, leg swelling, arthralgias, myalgias, or sour taste in the mouth.

## 2023-04-11 NOTE — PHYSICAL EXAM
[No Acute Distress] : no acute distress [Normal Oropharynx] : normal oropharynx [III] : Mallampati Class: III [Normal Appearance] : normal appearance [No Neck Mass] : no neck mass [Normal Rate/Rhythm] : normal rate/rhythm [Normal S1, S2] : normal s1, s2 [No Murmurs] : no murmurs [No Resp Distress] : no resp distress [Clear to Auscultation Bilaterally] : clear to auscultation bilaterally [No Abnormalities] : no abnormalities [Kyphosis] : kyphosis [Benign] : benign [Normal Gait] : normal gait [No Clubbing] : no clubbing [No Cyanosis] : no cyanosis [No Edema] : no edema [FROM] : FROM [Normal Color/ Pigmentation] : normal color/ pigmentation [No Focal Deficits] : no focal deficits [Oriented x3] : oriented x3 [Normal Affect] : normal affect [TextBox_2] : ow  [TextBox_68] : I:E ratio 1:3; clear

## 2023-05-31 NOTE — ADDENDUM
Plastic Surgeon Procedure Text (D): After obtaining clear surgical margins the patient was sent to plastics for surgical repair.  The patient understands they will receive post-surgical care and follow-up from the referring physician's office. [FreeTextEntry1] : Documented by Camron Ibrahim acting as a scribe for Dr. Steven Renee on 02/25/2021.\par \par All medical record entries made by the Scribe were at my, Dr. Steven Renee's, direction and personally dictated by me on 02/25/2021. I have reviewed the chart and agree that the record accurately reflects my personal performance of the history, physical exam, assessment and plan. I have also personally directed, reviewed, and agree with the discharge instructions.  Helical Rim Text: The closure involved the helical rim. Pain Refusal Text: I offered to prescribe pain medication but the patient refused to take this medication. Localized Dermabrasion With Wire Brush Text: The patient was draped in routine manner.  Localized dermabrasion using 3 x 17 mm wire brush was performed in routine manner to papillary dermis. This spot dermabrasion is being performed to complete skin cancer reconstruction. It also will eliminate the other sun damaged precancerous cells that are known to be part of the regional effect of a lifetime's worth of sun exposure. This localized dermabrasion is therapeutic and should not be considered cosmetic in any regard. Cartilage Fenestration Performed?: No Skin Substitute Text: The defect edges were debeveled with a #15 scalpel blade.  Given the location of the defect, shape of the defect and the proximity to free margins a skin substitute graft was deemed most appropriate.  The graft material was trimmed to fit the size of the defect. The graft was then placed in the primary defect and oriented appropriately. Consent (Scalp)/Introductory Paragraph: The rationale for Mohs was explained to the patient and consent was obtained. The risks, benefits and alternatives to therapy were discussed in detail. Specifically, the risks of changes in hair growth pattern secondary to repair, infection, scarring, bleeding, prolonged wound healing, incomplete removal, allergy to anesthesia, nerve injury and recurrence were addressed. Prior to the procedure, the treatment site was clearly identified and confirmed by the patient. All components of Universal Protocol/PAUSE Rule completed. Crescentic Intermediate Repair Preamble Text (Leave Blank If You Do Not Want): Undermining was performed with blunt dissection. Consent (Temporal Branch)/Introductory Paragraph: The rationale for Mohs was explained to the patient and consent was obtained. The risks, benefits and alternatives to therapy were discussed in detail. Specifically, the risks of damage to the temporal branch of the facial nerve, infection, scarring, bleeding, prolonged wound healing, incomplete removal, allergy to anesthesia, and recurrence were addressed. Prior to the procedure, the treatment site was clearly identified and confirmed by the patient. All components of Universal Protocol/PAUSE Rule completed. Mohs Rapid Report Verbiage: The area of clinically evident tumor was marked with skin marking ink and appropriately hatched.  The initial incision was made following the Mohs approach through the skin.  The specimen was taken to the lab, divided into the necessary number of pieces, chromacoded and processed according to the Mohs protocol.  This was repeated in successive stages until a tumor free defect was achieved. Number Of Stages: 1 Oculoplastic Surgeon Procedure Text (F): After obtaining clear surgical margins the patient was sent to oculoplastics for surgical repair.  The patient understands they will receive post-surgical care and follow-up from the referring physician's office. Show Mohs Stages In Case Summary (If You Hide Here Stages Will Be Calculated By Your Documentation In The Stages Tab)?: Yes Melolabial Transposition Flap Text: The defect edges were debeveled with a #15 scalpel blade.  Given the location of the defect and the proximity to free margins a melolabial flap was deemed most appropriate.  Using a sterile surgical marker, an appropriate melolabial transposition flap was drawn incorporating the defect.    The area thus outlined was incised deep to adipose tissue with a #15 scalpel blade.  The skin margins were undermined to an appropriate distance in all directions utilizing iris scissors. Inflammation Suggestive Of Cancer Camouflage Histology Text: There was a dense lymphocytic infiltrate which prevented adequate histologic evaluation of adjacent structures. Mucosal Advancement Flap Text: Given the location of the defect, shape of the defect and the proximity to free margins a mucosal advancement flap was deemed most appropriate. Incisions were made with a 15 blade scalpel in the appropriate fashion along the cutaneous vermilion border and the mucosal lip. The remaining actinically damaged mucosal tissue was excised.  The mucosal advancement flap was then elevated to the gingival sulcus with care taken to preserve the neurovascular structures and advanced into the primary defect. Care was taken to ensure that precise realignment of the vermilion border was achieved. Star Wedge Flap Text: The defect edges were debeveled with a #15 scalpel blade.  Given the location of the defect, shape of the defect and the proximity to free margins a star wedge flap was deemed most appropriate.  Using a sterile surgical marker, an appropriate rotation flap was drawn incorporating the defect and placing the expected incisions within the relaxed skin tension lines where possible. The area thus outlined was incised deep to adipose tissue with a #15 scalpel blade.  The skin margins were undermined to an appropriate distance in all directions utilizing iris scissors. O-L Flap Text: The defect edges were debeveled with a #15 scalpel blade.  Given the location of the defect, shape of the defect and the proximity to free margins an O-L flap was deemed most appropriate.  Using a sterile surgical marker, an appropriate advancement flap was drawn incorporating the defect and placing the expected incisions within the relaxed skin tension lines where possible.    The area thus outlined was incised deep to adipose tissue with a #15 scalpel blade.  The skin margins were undermined to an appropriate distance in all directions utilizing iris scissors. Post-Care Instructions: I reviewed with the patient in detail post-care instructions. Patient is not to engage in any heavy lifting, exercise, or swimming for the next 14 days. Should the patient develop any fevers, chills, bleeding, severe pain patient will contact the office immediately. Advancement Flap (Single) Text: The defect edges were debeveled with a #15 scalpel blade.  Given the location of the defect and the proximity to free margins a single advancement flap was deemed most appropriate.  Using a sterile surgical marker, an appropriate advancement flap was drawn incorporating the defect and placing the expected incisions within the relaxed skin tension lines where possible.    The area thus outlined was incised deep to adipose tissue with a #15 scalpel blade.  The skin margins were undermined to an appropriate distance in all directions utilizing iris scissors. Brow Lift Text: A midfrontal incision was made medially to the defect to allow access to the tissues just superior to the left eyebrow. Following careful dissection inferiorly in a supraperiosteal plane to the level of the left eyebrow, several 3-0 monocryl sutures were used to resuspend the eyebrow orbicularis oculi muscular unit to the superior frontal bone periosteum. This resulted in an appropriate reapproximation of static eyebrow symmetry and correction of the left brow ptosis. Eye Protection Verbiage: Before proceeding with the stage, a plastic scleral shield was inserted. The globe was anesthetized with a few drops of 1% lidocaine with 1:100,000 epinephrine. Then, an appropriate sized scleral shield was chosen and coated with lacrilube ointment. The shield was gently inserted and left in place for the duration of each stage. After the stage was completed, the shield was gently removed. Undermining Location (Optional): at the junction of the superficial and deep fat Ear Wedge Repair Text: A wedge excision was completed by carrying down an excision through the full thickness of the ear and cartilage with an inward facing Burow's triangle. The wound was then closed in a layered fashion. Scc In Situ Histology Text: Within the epidermis is a full-thickness proliferation of atypical keratinocytes with mitoses. The overlying stratum corneum demonstrates parakeratosis. No invasion is noted. Epidermal Autograft Text: The defect edges were debeveled with a #15 scalpel blade.  Given the location of the defect, shape of the defect and the proximity to free margins an epidermal autograft was deemed most appropriate.  Using a sterile surgical marker, the primary defect shape was transferred to the donor site. The epidermal graft was then harvested.  The skin graft was then placed in the primary defect and oriented appropriately. Asc Procedure Text (A): After obtaining clear surgical margins the patient was sent to an ASC for surgical repair.  The patient understands they will receive post-surgical care and follow-up from the ASC physician. Why Was The Change Made?: Please Select the Appropriate Response Number Of Deep Sutures (Optional): 3 Paramedian Forehead Flap Text: A decision was made to reconstruct the defect utilizing an interpolation axial flap and a staged reconstruction.  A telfa template was made of the defect.  This telfa template was then used to outline the paramedian forehead pedicle flap.  The donor area for the pedicle flap was then injected with anesthesia.  The flap was excised through the skin and subcutaneous tissue down to the layer of the underlying musculature.  The pedicle flap was carefully excised within this deep plane to maintain its blood supply.  The edges of the donor site were undermined.   The donor site was closed in a primary fashion.  The pedicle was then rotated into position and sutured.  Once the tube was sutured into place, adequate blood supply was confirmed with blanching and refill.  The pedicle was then wrapped with xeroform gauze and dressed appropriately with a telfa and gauze bandage to ensure continued blood supply and protect the attached pedicle. Zygomaticofacial Flap Text: Given the location of the defect, shape of the defect and the proximity to free margins a zygomaticofacial flap was deemed most appropriate for repair.  Using a sterile surgical marker, the appropriate flap was drawn incorporating the defect and placing the expected incisions within the relaxed skin tension lines where possible. The area thus outlined was incised deep to adipose tissue with a #15 scalpel blade with preservation of a vascular pedicle.  The skin margins were undermined to an appropriate distance in all directions utilizing iris scissors.  The flap was then placed into the defect and anchored with interrupted buried subcutaneous sutures. O-Z Plasty Text: The defect edges were debeveled with a #15 scalpel blade.  Given the location of the defect, shape of the defect and the proximity to free margins an O-Z plasty (double transposition flap) was deemed most appropriate.  Using a sterile surgical marker, the appropriate transposition flaps were drawn incorporating the defect and placing the expected incisions within the relaxed skin tension lines where possible.    The area thus outlined was incised deep to adipose tissue with a #15 scalpel blade.  The skin margins were undermined to an appropriate distance in all directions utilizing iris scissors.  Hemostasis was achieved with electrocautery.  The flaps were then transposed into place, one clockwise and the other counterclockwise, and anchored with interrupted buried subcutaneous sutures. Island Pedicle Flap Text: The defect edges were debeveled with a #15 scalpel blade.  Given the location of the defect, shape of the defect and the proximity to free margins an island pedicle advancement flap was deemed most appropriate.  Using a sterile surgical marker, an appropriate advancement flap was drawn incorporating the defect, outlining the appropriate donor tissue and placing the expected incisions within the relaxed skin tension lines where possible.    The area thus outlined was incised deep to adipose tissue with a #15 scalpel blade.  The skin margins were undermined to an appropriate distance in all directions around the primary defect and laterally outward around the island pedicle utilizing iris scissors.  There was minimal undermining beneath the pedicle flap. Stage 4: Number Of Blocks?: 0 Tarsorrhaphy Text: A tarsorrhaphy was performed using Frost sutures. Tissue Cultured Epidermal Autograft Text: The defect edges were debeveled with a #15 scalpel blade.  Given the location of the defect, shape of the defect and the proximity to free margins a tissue cultured epidermal autograft was deemed most appropriate.  The graft was then trimmed to fit the size of the defect.  The graft was then placed in the primary defect and oriented appropriately. Special Stains Stage 3 - Results: Base On Clearance Noted Above Wound Care (No Sutures): Petrolatum Anesthesia Type: 0.5% lidocaine with 1:200,000 epinephrine and a 1:10 solution of 8.4% sodium bicarbonate Perineural Invasion (For Histology - Be Specific If Possible): absent Primary Defect Length In Cm (Final Defect Size - Required For Flaps/Grafts): 2.5 Consent (Marginal Mandibular)/Introductory Paragraph: The rationale for Mohs was explained to the patient and consent was obtained. The risks, benefits and alternatives to therapy were discussed in detail. Specifically, the risks of damage to the marginal mandibular branch of the facial nerve, infection, scarring, bleeding, prolonged wound healing, incomplete removal, allergy to anesthesia, and recurrence were addressed. Prior to the procedure, the treatment site was clearly identified and confirmed by the patient. All components of Universal Protocol/PAUSE Rule completed. Mauc Instructions: By selecting yes to the question below the MAUC number will be added into the note.  This will be calculated automatically based on the diagnosis chosen, the size entered, the body zone selected (H,M,L) and the specific indications you chose. You will also have the option to override the Mohs AUC if you disagree with the automatically calculated number and this option is found in the Case Summary tab. Dfsp Histology Text: Present throughout the dermis and extending into the subcutis are fascicles of monomorphic spindled cells with a storiform pattern. The spindled cells have an infiltrative growth pattern with entrapment of adipocytes. Individual cells are hyperchromatic with elongated nuclei. Scattered mitoses are present. Peng Advancement Flap Text: The defect edges were debeveled with a #15 scalpel blade.  Given the location of the defect, shape of the defect and the proximity to free margins a Peng advancement flap was deemed most appropriate.  Using a sterile surgical marker, an appropriate advancement flap was drawn incorporating the defect and placing the expected incisions within the relaxed skin tension lines where possible. The area thus outlined was incised deep to adipose tissue with a #15 scalpel blade.  The skin margins were undermined to an appropriate distance in all directions utilizing iris scissors. Stage 2: Number Of Blocks?: 2 Body Location Override (Optional - Billing Will Still Be Based On Selected Body Map Location If Applicable): right mid 2nd metatarsal Unna Boot Text: An Unna boot was placed to help immobilize the limb and facilitate more rapid healing. O-Z Flap Text: The defect edges were debeveled with a #15 scalpel blade.  Given the location of the defect, shape of the defect and the proximity to free margins an O-Z flap was deemed most appropriate.  Using a sterile surgical marker, an appropriate transposition flap was drawn incorporating the defect and placing the expected incisions within the relaxed skin tension lines where possible. The area thus outlined was incised deep to adipose tissue with a #15 scalpel blade.  The skin margins were undermined to an appropriate distance in all directions utilizing iris scissors. Suture Removal: 7 days Advancement Flap (Double) Text: The defect edges were debeveled with a #15 scalpel blade.  Given the location of the defect and the proximity to free margins a double advancement flap was deemed most appropriate.  Using a sterile surgical marker, the appropriate advancement flaps were drawn incorporating the defect and placing the expected incisions within the relaxed skin tension lines where possible.    The area thus outlined was incised deep to adipose tissue with a #15 scalpel blade.  The skin margins were undermined to an appropriate distance in all directions utilizing iris scissors. Referred To Otolaryngology For Closure Text (Leave Blank If You Do Not Want): After obtaining clear surgical margins the patient was sent to otolaryngology for surgical repair.  The patient understands they will receive post-surgical care and follow-up from the referring physician's office. Mohs Method Verbiage: An incision at a 45 degree angle following the standard Mohs approach was done and the specimen was harvested as a microscopic controlled layer. Transposition Flap Text: The defect edges were debeveled with a #15 scalpel blade.  Given the location of the defect and the proximity to free margins a transposition flap was deemed most appropriate.  Using a sterile surgical marker, an appropriate transposition flap was drawn incorporating the defect.    The area thus outlined was incised deep to adipose tissue with a #15 scalpel blade.  The skin margins were undermined to an appropriate distance in all directions utilizing iris scissors. Rhombic Flap Text: The defect edges were debeveled with a #15 scalpel blade.  Given the location of the defect and the proximity to free margins a rhombic flap was deemed most appropriate.  Using a sterile surgical marker, an appropriate rhombic flap was drawn incorporating the defect.    The area thus outlined was incised deep to adipose tissue with a #15 scalpel blade.  The skin margins were undermined to an appropriate distance in all directions utilizing iris scissors. Area H Indication Text: Tumors in this location are included in Area H (eyelids, eyebrows, nose, lips, chin, ear, pre-auricular, post-auricular, temple, genitalia, hands, feet, ankles and areola).  Tissue conservation is critical in these anatomic locations. Full Thickness Lip Wedge Repair (Flap) Text: Given the location of the defect and the proximity to free margins a full thickness wedge repair was deemed most appropriate.  Using a sterile surgical marker, the appropriate repair was drawn incorporating the defect and placing the expected incisions perpendicular to the vermilion border.  The vermilion border was also meticulously outlined to ensure appropriate reapproximation during the repair.  The area thus outlined was incised through and through with a #15 scalpel blade.  The muscularis and dermis were reaproximated with deep sutures following hemostasis. Care was taken to realign the vermilion border before proceeding with the superficial closure.  Once the vermilion was realigned the superfical and mucosal closure was finished. Scc In Situ With Follicular Extension Histology Text: Within the epidermis is a full thickness proliferation of atypical keratinocytes with mitoses. The overlying stratum corneum demonstrates parakeratosis. No invasion is noted. The atypical cells extend down hair follicle structures. Abbe Flap (Upper To Lower Lip) Text: The defect of the lower lip was assessed and measured.  Given the location and size of the defect, an Abbe flap was deemed most appropriate.  Using a sterile surgical marker, an appropriate Abbe flap was measured and drawn on the upper lip. Local anesthesia was then infiltrated.  A scalpel was then used to incise the upper lip through and through the skin, vermilion, muscle and mucosa, leaving the flap pedicled on the opposite side.  The flap was then rotated and transferred to the lower lip defect.  The flap was then sutured into place with a three layer technique, closing the orbicularis oris muscle layer with subcutaneous buried sutures, followed by a mucosal layer and an epidermal layer. Stage 9: Additional Anesthesia Type: 1% lidocaine with epinephrine Manual Repair Warning Statement: We plan on removing the manually selected variable below in favor of our much easier automatic structured text blocks found in the previous tab. We decided to do this to help make the flow better and give you the full power of structured data. Manual selection is never going to be ideal in our platform and I would encourage you to avoid using manual selection from this point on, especially since I will be sunsetting this feature. It is important that you do one of two things with the customized text below. First, you can save all of the text in a word file so you can have it for future reference. Second, transfer the text to the appropriate area in the Library tab. Lastly, if there is a flap or graft type which we do not have you need to let us know right away so I can add it in before the variable is hidden. No need to panic, we plan to give you roughly 6 months to make the change. Bcc Superficial Histology Text: Arising from the epidermis and superficial hair follicles are small, superficial, multicentric buds of basaloid keratinocytes. The cells have scant cytoplasm and round dark nuclei. Mitotic figures and apoptotic bodies are evident. The nuclei at the periphery of the islands have a palisaded arrangement. The islands are associated with a fibromyxoid stroma and there is cleft formation between some of the islands and stroma. Cheek Interpolation Flap Text: A decision was made to reconstruct the defect utilizing an interpolation axial flap and a staged reconstruction.  A telfa template was made of the defect.  This telfa template was then used to outline the Cheek Interpolation flap.  The donor area for the pedicle flap was then injected with anesthesia.  The flap was excised through the skin and subcutaneous tissue down to the layer of the underlying musculature.  The interpolation flap was carefully excised within this deep plane to maintain its blood supply.  The edges of the donor site were undermined.   The donor site was closed in a primary fashion.  The pedicle was then rotated into position and sutured.  Once the tube was sutured into place, adequate blood supply was confirmed with blanching and refill.  The pedicle was then wrapped with xeroform gauze and dressed appropriately with a telfa and gauze bandage to ensure continued blood supply and protect the attached pedicle. Island Pedicle Flap With Canthal Suspension Text: The defect edges were debeveled with a #15 scalpel blade.  Given the location of the defect, shape of the defect and the proximity to free margins an island pedicle advancement flap was deemed most appropriate.  Using a sterile surgical marker, an appropriate advancement flap was drawn incorporating the defect, outlining the appropriate donor tissue and placing the expected incisions within the relaxed skin tension lines where possible. The area thus outlined was incised deep to adipose tissue with a #15 scalpel blade.  The skin margins were undermined to an appropriate distance in all directions around the primary defect and laterally outward around the island pedicle utilizing iris scissors.  There was minimal undermining beneath the pedicle flap. A suspension suture was placed in the canthal tendon to prevent tension and prevent ectropion. Double O-Z Plasty Text: The defect edges were debeveled with a #15 scalpel blade.  Given the location of the defect, shape of the defect and the proximity to free margins a Double O-Z plasty (double transposition flap) was deemed most appropriate.  Using a sterile surgical marker, the appropriate transposition flaps were drawn incorporating the defect and placing the expected incisions within the relaxed skin tension lines where possible. The area thus outlined was incised deep to adipose tissue with a #15 scalpel blade.  The skin margins were undermined to an appropriate distance in all directions utilizing iris scissors.  Hemostasis was achieved with electrocautery.  The flaps were then transposed into place, one clockwise and the other counterclockwise, and anchored with interrupted buried subcutaneous sutures. Ear Star Wedge Flap Text: The defect edges were debeveled with a #15 blade scalpel.  Given the location of the defect and the proximity to free margins (helical rim) an ear star wedge flap was deemed most appropriate.  Using a sterile surgical marker, the appropriate flap was drawn incorporating the defect and placing the expected incisions between the helical rim and antihelix where possible.  The area thus outlined was incised through and through with a #15 scalpel blade. Suturegard Intro: Intraoperative tissue expansion was performed, utilizing the SUTUREGARD device, in order to reduce wound tension. Epidermal Sutures: 3-0 Nylon Epidermal Closure: simple interrupted Dorsal Nasal Flap Text: The defect edges were debeveled with a #15 scalpel blade.  Given the location of the defect and the proximity to free margins a dorsal nasal flap was deemed most appropriate.  Using a sterile surgical marker, an appropriate dorsal nasal flap was drawn around the defect.    The area thus outlined was incised deep to adipose tissue with a #15 scalpel blade.  The skin margins were undermined to an appropriate distance in all directions utilizing iris scissors. Composite Graft Text: The defect edges were debeveled with a #15 scalpel blade.  Given the location of the defect, shape of the defect, the proximity to free margins and the fact the defect was full thickness a composite graft was deemed most appropriate.  The defect was outline and then transferred to the donor site.  A full thickness graft was then excised from the donor site. The graft was then placed in the primary defect, oriented appropriately and then sutured into place.  The secondary defect was then repaired using a primary closure. Scc Poorly Differentiated Histology Text: Arising from the epidermis is a pleomorphic proliferation of atypical keratinocytes with mitoses. Invasion into the dermis is noted. Cheiloplasty (Complex) Text: A decision was made to reconstruct the defect with a  cheiloplasty.  The defect was undermined extensively.  Additional obicularis oris muscle was excised with a 15 blade scalpel.  The defect was converted into a full thickness wedge to facilite a better cosmetic result.  Small vessels were then tied off with 5-0 monocyrl. The obicularis oris, superficial fascia, adipose and dermis were then reapproximated.  After the deeper layers were approximated the epidermis was reapproximated with particular care given to realign the vermilion border. Double Z Plasty Text: The lesion was extirpated to the level of the fat with a #15 scalpel blade. Given the location of the defect, shape of the defect and the proximity to free margins a double Z-plasty was deemed most appropriate for repair. Using a sterile surgical marker, the appropriate transposition arms of the double Z-plasty were drawn incorporating the defect and placing the expected incisions within the relaxed skin tension lines where possible. The area thus outlined was incised deep to adipose tissue with a #15 scalpel blade. The skin margins were undermined to an appropriate distance in all directions utilizing iris scissors. The opposing transposition arms were then transposed and carried over into place in opposite direction and anchored with interrupted buried subcutaneous sutures. Posterior Auricular Interpolation Flap Text: A decision was made to reconstruct the defect utilizing an interpolation axial flap and a staged reconstruction.  A telfa template was made of the defect.  This telfa template was then used to outline the posterior auricular interpolation flap.  The donor area for the pedicle flap was then injected with anesthesia.  The flap was excised through the skin and subcutaneous tissue down to the layer of the underlying musculature.  The pedicle flap was carefully excised within this deep plane to maintain its blood supply.  The edges of the donor site were undermined.   The donor site was closed in a primary fashion.  The pedicle was then rotated into position and sutured.  Once the tube was sutured into place, adequate blood supply was confirmed with blanching and refill.  The pedicle was then wrapped with xeroform gauze and dressed appropriately with a telfa and gauze bandage to ensure continued blood supply and protect the attached pedicle. O-T Plasty Text: The defect edges were debeveled with a #15 scalpel blade.  Given the location of the defect, shape of the defect and the proximity to free margins an O-T plasty was deemed most appropriate.  Using a sterile surgical marker, an appropriate O-T plasty was drawn incorporating the defect and placing the expected incisions within the relaxed skin tension lines where possible.    The area thus outlined was incised deep to adipose tissue with a #15 scalpel blade.  The skin margins were undermined to an appropriate distance in all directions utilizing iris scissors. Repair Type: Partial Purse String Closure (Intermediate) Retention Suture Bite Size: 3 mm Mid-Level Procedure Text (F): After obtaining clear surgical margins the patient was sent to a mid-level provider for surgical repair.  The patient understands they will receive post-surgical care and follow-up from the mid-level provider. Consent 2/Introductory Paragraph: Mohs surgery was explained to the patient and consent was obtained. The risks, benefits and alternatives to therapy were discussed in detail. Specifically, the risks of infection, scarring, bleeding, prolonged wound healing, incomplete removal, allergy to anesthesia, nerve injury and recurrence were addressed. Prior to the procedure, the treatment site was clearly identified and confirmed by the patient. All components of Universal Protocol/PAUSE Rule completed. Complex Repair And Graft Additional Text (Will Appearing After The Standard Complex Repair Text): The complex repair was not sufficient to completely close the primary defect. The remaining additional defect was repaired with the graft mentioned below. Staging Info: By selecting yes to the question above you will include information on AJCC 8 tumor staging in your Mohs note. Information on tumor staging will be automatically added for SCCs on the head and neck. AJCC 8 includes tumor size, tumor depth, perineural involvement and bone invasion. M-Plasty Complex Repair Preamble Text (Leave Blank If You Do Not Want): Extensive wide undermining was performed. Information: Selecting Yes will display possible errors in your note based on the variables you have selected. This validation is only offered as a suggestion for you. PLEASE NOTE THAT THE VALIDATION TEXT WILL BE REMOVED WHEN YOU FINALIZE YOUR NOTE. IF YOU WANT TO FAX A PRELIMINARY NOTE YOU WILL NEED TO TOGGLE THIS TO 'NO' IF YOU DO NOT WANT IT IN YOUR FAXED NOTE. Graft Cartilage Fenestration Text: The cartilage was fenestrated with a 2mm punch biopsy to help facilitate graft survival and healing. Consent (Nose)/Introductory Paragraph: The rationale for Mohs was explained to the patient and consent was obtained. The risks, benefits and alternatives to therapy were discussed in detail. Specifically, the risks of nasal deformity, changes in the flow of air through the nose, infection, scarring, bleeding, prolonged wound healing, incomplete removal, allergy to anesthesia, nerve injury and recurrence were addressed. Prior to the procedure, the treatment site was clearly identified and confirmed by the patient. All components of Universal Protocol/PAUSE Rule completed. Partial Purse String (Simple) Text: Given the location of the defect and the characteristics of the surrounding skin a simple purse string closure was deemed most appropriate.  Undermining was performed circumfirentially around the surgical defect.  A purse string suture was then placed and tightened. Wound tension only allowed a partial closure of the circular defect. Number Of Epidermal Sutures (Optional): 7 Nasalis-Muscle-Based Myocutaneous Island Pedicle Flap Text: Using a #15 blade, an incision was made around the donor flap to the level of the nasalis muscle. Wide lateral undermining was then performed in both the subcutaneous plane above the nasalis muscle, and in a submuscular plane just above periosteum. This allowed the formation of a free nasalis muscle axial pedicle (based on the angular artery) which was still attached to the actual cutaneous flap, increasing its mobility and vascular viability. Hemostasis was obtained with pinpoint electrocoagulation. The flap was mobilized into position and the pivotal anchor points positioned and stabilized with buried interrupted sutures. Subcutaneous and dermal tissues were closed in a multilayered fashion with sutures. Tissue redundancies were excised, and the epidermal edges were apposed without significant tension and sutured with sutures. Same Histology In Subsequent Stages Text: The pattern and morphology of the tumor is as described in the first stage. Bilateral Helical Rim Advancement Flap Text: The defect edges were debeveled with a #15 blade scalpel.  Given the location of the defect and the proximity to free margins (helical rim) a bilateral helical rim advancement flap was deemed most appropriate.  Using a sterile surgical marker, the appropriate advancement flaps were drawn incorporating the defect and placing the expected incisions between the helical rim and antihelix where possible.  The area thus outlined was incised through and through with a #15 scalpel blade.  With a skin hook and iris scissors, the flaps were gently and sharply undermined and freed up. Spiral Flap Text: The defect edges were debeveled with a #15 scalpel blade.  Given the location of the defect, shape of the defect and the proximity to free margins a spiral flap was deemed most appropriate.  Using a sterile surgical marker, an appropriate rotation flap was drawn incorporating the defect and placing the expected incisions within the relaxed skin tension lines where possible. The area thus outlined was incised deep to adipose tissue with a #15 scalpel blade.  The skin margins were undermined to an appropriate distance in all directions utilizing iris scissors. Previous Accession (Optional): [id number] Mart-1 - Positive Histology Text: MART-1 staining demonstrates areas of higher density and clustering of melanocytes with Pagetoid spread upwards within the epidermis. The surgical margins are positive for tumor cells. Mercedes Flap Text: The defect edges were debeveled with a #15 scalpel blade.  Given the location of the defect, shape of the defect and the proximity to free margins a Mercedes flap was deemed most appropriate.  Using a sterile surgical marker, an appropriate advancement flap was drawn incorporating the defect and placing the expected incisions within the relaxed skin tension lines where possible. The area thus outlined was incised deep to adipose tissue with a #15 scalpel blade.  The skin margins were undermined to an appropriate distance in all directions utilizing iris scissors. Repair Performed By Another Provider Text (Leave Blank If You Do Not Want): After obtaining clear surgical margins the defect was repaired by another provider. A-T Advancement Flap Text: The defect edges were debeveled with a #15 scalpel blade.  Given the location of the defect, shape of the defect and the proximity to free margins an A-T advancement flap was deemed most appropriate.  Using a sterile surgical marker, an appropriate advancement flap was drawn incorporating the defect and placing the expected incisions within the relaxed skin tension lines where possible.    The area thus outlined was incised deep to adipose tissue with a #15 scalpel blade.  The skin margins were undermined to an appropriate distance in all directions utilizing iris scissors. Secondary Intention Text (Leave Blank If You Do Not Want): The defect will heal with secondary intention. Undermining Type: Entire Wound Hemigard Postcare Instructions: The HEMIGARD strips are to remain completely dry for at least 5-7 days. Estimated Blood Loss (Cc): minimal Afx Histology Text: Present within the dermis is a highly cellular neoplasm composed of pleomorphic spindled and epithelioid cells. The cells are arranged in a haphazard fashion and associated with mitotic figures, including atypical forms. Dermal Autograft Text: The defect edges were debeveled with a #15 scalpel blade.  Given the location of the defect, shape of the defect and the proximity to free margins a dermal autograft was deemed most appropriate.  Using a sterile surgical marker, the primary defect shape was transferred to the donor site. The area thus outlined was incised deep to adipose tissue with a #15 scalpel blade.  The harvested graft was then trimmed of adipose and epidermal tissue until only dermis was left.  The skin graft was then placed in the primary defect and oriented appropriately. Partial Purse String (Intermediate) Text: Given the location of the defect and the characteristics of the surrounding skin an intermediate purse string closure was deemed most appropriate.  Undermining was performed circumfirentially around the surgical defect.  A purse string suture was then placed and tightened. Wound tension only allowed a partial closure of the circular defect. Consent 3/Introductory Paragraph: I gave the patient a chance to ask questions they had about the procedure.  Following this I explained the Mohs procedure and consent was obtained. The risks, benefits and alternatives to therapy were discussed in detail. Specifically, the risks of infection, scarring, bleeding, prolonged wound healing, incomplete removal, allergy to anesthesia, nerve injury and recurrence were addressed. Prior to the procedure, the treatment site was clearly identified and confirmed by the patient. All components of Universal Protocol/PAUSE Rule completed. Subsequent Stages Histo Method Verbiage: Using a similar technique to that described above, a thin layer of tissue was removed from all areas where tumor was visible on the previous stage.  The tissue was again oriented, mapped, dyed, and processed as above. Consent (Lip)/Introductory Paragraph: The rationale for Mohs was explained to the patient and consent was obtained. The risks, benefits and alternatives to therapy were discussed in detail. Specifically, the risks of lip deformity, changes in the oral aperture, infection, scarring, bleeding, prolonged wound healing, incomplete removal, allergy to anesthesia, nerve injury and recurrence were addressed. Prior to the procedure, the treatment site was clearly identified and confirmed by the patient. All components of Universal Protocol/PAUSE Rule completed. X Size Of Lesion In Cm (Optional): 1.5 Mart-1 - Negative Histology Text: MART-1 staining demonstrates a normal density and pattern of melanocytes along the dermal-epidermal junction. The surgical margins are negative for tumor cells. Orbicularis Oris Muscle Flap Text: The defect edges were debeveled with a #15 scalpel blade.  Given that the defect affected the competency of the oral sphincter an orbicularis oris muscle flap was deemed most appropriate to restore this competency and normal muscle function.  Using a sterile surgical marker, an appropriate flap was drawn incorporating the defect. The area thus outlined was incised with a #15 scalpel blade. No Residual Tumor Seen Histology Text: There were no malignant cells seen in the sections examined. Staged Advancement Flap Text: The defect edges were debeveled with a #15 scalpel blade.  Given the location of the defect, shape of the defect and the proximity to free margins a staged advancement flap was deemed most appropriate.  Using a sterile surgical marker, an appropriate advancement flap was drawn incorporating the defect and placing the expected incisions within the relaxed skin tension lines where possible. The area thus outlined was incised deep to adipose tissue with a #15 scalpel blade.  The skin margins were undermined to an appropriate distance in all directions utilizing iris scissors. Modified Advancement Flap Text: The defect edges were debeveled with a #15 scalpel blade.  Given the location of the defect, shape of the defect and the proximity to free margins a modified advancement flap was deemed most appropriate.  Using a sterile surgical marker, an appropriate advancement flap was drawn incorporating the defect and placing the expected incisions within the relaxed skin tension lines where possible.    The area thus outlined was incised deep to adipose tissue with a #15 scalpel blade.  The skin margins were undermined to an appropriate distance in all directions utilizing iris scissors. Adjacent Tissue Transfer Text: The defect edges were debeveled with a #15 scalpel blade.  Given the location of the defect and the proximity to free margins an adjacent tissue transfer was deemed most appropriate.  Using a sterile surgical marker, an appropriate flap was drawn incorporating the defect and placing the expected incisions within the relaxed skin tension lines where possible.    The area thus outlined was incised deep to adipose tissue with a #15 scalpel blade.  The skin margins were undermined to an appropriate distance in all directions utilizing iris scissors. O-T Advancement Flap Text: The defect edges were debeveled with a #15 scalpel blade.  Given the location of the defect, shape of the defect and the proximity to free margins an O-T advancement flap was deemed most appropriate.  Using a sterile surgical marker, an appropriate advancement flap was drawn incorporating the defect and placing the expected incisions within the relaxed skin tension lines where possible.    The area thus outlined was incised deep to adipose tissue with a #15 scalpel blade.  The skin margins were undermined to an appropriate distance in all directions utilizing iris scissors. No Repair - Repaired With Adjacent Surgical Defect Text (Leave Blank If You Do Not Want): After obtaining clear surgical margins the defect was repaired concurrently with another surgical defect which was in close approximation. Stage 1: Number Of Blocks?: 4 Depth Of Tumor Invasion (For Histology): dermis Anesthesia Volume In Cc: 6 Bilateral Rotation Flap Text: The defect edges were debeveled with a #15 scalpel blade. Given the location of the defect, shape of the defect and the proximity to free margins a bilateral rotation flap was deemed most appropriate. Using a sterile surgical marker, an appropriate rotation flap was drawn incorporating the defect and placing the expected incisions within the relaxed skin tension lines where possible. The area thus outlined was incised deep to adipose tissue with a #15 scalpel blade. The skin margins were undermined to an appropriate distance in all directions utilizing iris scissors. Following this, the designed flap was carried over into the primary defect and sutured into place. Date Of Previous Biopsy (Optional): 05/02/2023 Nasal Turnover Hinge Flap Text: The defect edges were debeveled with a #15 scalpel blade.  Given the size, depth, location of the defect and the defect being full thickness a nasal turnover hinge flap was deemed most appropriate.  Using a sterile surgical marker, an appropriate hinge flap was drawn incorporating the defect. The area thus outlined was incised with a #15 scalpel blade. The flap was designed to recreate the nasal mucosal lining and the alar rim. The skin margins were undermined to an appropriate distance in all directions utilizing iris scissors. Advancement-Rotation Flap Text: The defect edges were debeveled with a #15 scalpel blade.  Given the location of the defect, shape of the defect and the proximity to free margins an advancement-rotation flap was deemed most appropriate.  Using a sterile surgical marker, an appropriate flap was drawn incorporating the defect and placing the expected incisions within the relaxed skin tension lines where possible. The area thus outlined was incised deep to adipose tissue with a #15 scalpel blade.  The skin margins were undermined to an appropriate distance in all directions utilizing iris scissors. Crescentic Advancement Flap Text: The defect edges were debeveled with a #15 scalpel blade.  Given the location of the defect and the proximity to free margins a crescentic advancement flap was deemed most appropriate.  Using a sterile surgical marker, the appropriate advancement flap was drawn incorporating the defect and placing the expected incisions within the relaxed skin tension lines where possible.    The area thus outlined was incised deep to adipose tissue with a #15 scalpel blade.  The skin margins were undermined to an appropriate distance in all directions utilizing iris scissors. Melolabial Interpolation Flap Text: A decision was made to reconstruct the defect utilizing an interpolation axial flap and a staged reconstruction.  A telfa template was made of the defect.  This telfa template was then used to outline the melolabial interpolation flap.  The donor area for the pedicle flap was then injected with anesthesia.  The flap was excised through the skin and subcutaneous tissue down to the layer of the underlying musculature.  The pedicle flap was carefully excised within this deep plane to maintain its blood supply.  The edges of the donor site were undermined.   The donor site was closed in a primary fashion.  The pedicle was then rotated into position and sutured.  Once the tube was sutured into place, adequate blood supply was confirmed with blanching and refill.  The pedicle was then wrapped with xeroform gauze and dressed appropriately with a telfa and gauze bandage to ensure continued blood supply and protect the attached pedicle. Bcc  Nodular Histology Text: Emanating from the epidermis and infiltrating the dermis are irregularly shaped islands of basaloid keratinocytes. The cells have scant cytoplasm and round dark nuclei. Mitotic figures and apoptotic bodies are evident. The nuclei at the periphery of the islands have a palisaded arrangement. The islands are associated with a fibromyxoid stroma and there is a cleft formation between some of the islands and stroma. W Plasty Text: The lesion was extirpated to the level of the fat with a #15 scalpel blade.  Given the location of the defect, shape of the defect and the proximity to free margins a W-plasty was deemed most appropriate for repair.  Using a sterile surgical marker, the appropriate transposition arms of the W-plasty were drawn incorporating the defect and placing the expected incisions within the relaxed skin tension lines where possible.    The area thus outlined was incised deep to adipose tissue with a #15 scalpel blade.  The skin margins were undermined to an appropriate distance in all directions utilizing iris scissors.  The opposing transposition arms were then transposed into place in opposite direction and anchored with interrupted buried subcutaneous sutures. Bilobed Transposition Flap Text: The defect edges were debeveled with a #15 scalpel blade.  Given the location of the defect and the proximity to free margins a bilobed transposition flap was deemed most appropriate.  Using a sterile surgical marker, an appropriate bilobe flap drawn around the defect.    The area thus outlined was incised deep to adipose tissue with a #15 scalpel blade.  The skin margins were undermined to an appropriate distance in all directions utilizing iris scissors. Island Pedicle Flap-Requiring Vessel Identification Text: The defect edges were debeveled with a #15 scalpel blade.  Given the location of the defect, shape of the defect and the proximity to free margins an island pedicle advancement flap was deemed most appropriate.  Using a sterile surgical marker, an appropriate advancement flap was drawn, based on the axial vessel mentioned above, incorporating the defect, outlining the appropriate donor tissue and placing the expected incisions within the relaxed skin tension lines where possible.    The area thus outlined was incised deep to adipose tissue with a #15 scalpel blade.  The skin margins were undermined to an appropriate distance in all directions around the primary defect and laterally outward around the island pedicle utilizing iris scissors.  There was minimal undermining beneath the pedicle flap. Burow's Graft Text: The defect edges were debeveled with a #15 scalpel blade.  Given the location of the defect, shape of the defect, the proximity to free margins and the presence of a standing cone deformity a Burow's skin graft was deemed most appropriate. The standing cone was removed and this tissue was then trimmed to the shape of the primary defect. The adipose tissue was also removed until only dermis and epidermis were left.  The skin margins of the secondary defect were undermined to an appropriate distance in all directions utilizing iris scissors.  The secondary defect was closed with interrupted buried subcutaneous sutures.  The skin edges were then re-apposed with running  sutures.  The skin graft was then placed in the primary defect and oriented appropriately. Scc Well Differentiated Histology Text: Arising from the epidermis is a keratin-producing proliferation of atypical keratinocytes with invasion into the underlying dermis. Mitoses and atypical forms are evident. Intercellular bridge and keratin augusta formation are evident. Estlander Flap (Lower To Upper Lip) Text: The defect of the lower lip was assessed and measured.  Given the location and size of the defect, an Estlander flap was deemed most appropriate.  Using a sterile surgical marker, an appropriate Estlander flap was measured and drawn on the upper lip. Local anesthesia was then infiltrated. A scalpel was then used to incise the lateral aspect of the flap, through skin, muscle and mucosa, leaving the flap pedicled medially.  The flap was then rotated and positioned to fill the lower lip defect.  The flap was then sutured into place with a three layer technique, closing the orbicularis oris muscle layer with subcutaneous buried sutures, followed by a mucosal layer and an epidermal layer. Closure 3 Information: This tab is for additional flaps and grafts above and beyond our usual structured repairs.  Please note if you enter information here it will not currently bill and you will need to add the billing information manually. Dressing: pressure dressing Home Suture Removal Text: Patient was provided instructions on removing sutures and will remove their sutures at home.  If they have any questions or difficulties they will call the office. Length To Time In Minutes Device Was In Place: 10 Consent (Near Eyelid Margin)/Introductory Paragraph: The rationale for Mohs was explained to the patient and consent was obtained. The risks, benefits and alternatives to therapy were discussed in detail. Specifically, the risks of ectropion or eyelid deformity, infection, scarring, bleeding, prolonged wound healing, incomplete removal, allergy to anesthesia, nerve injury and recurrence were addressed. Prior to the procedure, the treatment site was clearly identified and confirmed by the patient. All components of Universal Protocol/PAUSE Rule completed. Alternatives Discussed Intro (Do Not Add Period): I discussed alternative treatments to Mohs surgery and specifically discussed the risks and benefits of Referring Physician (Optional): Gaston Miranda MD Intermediate Repair And Graft Additional Text (Will Appearing After The Standard Complex Repair Text): The intermediate repair was not sufficient to completely close the primary defect. The remaining additional defect was repaired with the graft mentioned below. Purse String (Simple) Text: Given the location of the defect and the characteristics of the surrounding skin a purse string closure was deemed most appropriate.  Undermining was performed circumfirentially around the surgical defect.  A purse string suture was then placed and tightened. Location Indication Override (Is Already Calculated Based On Selected Body Location): Area H Hemigard Retention Suture: 2-0 Nylon Simple / Intermediate / Complex Repair - Final Wound Length In Cm: 5 Keystone Flap Text: The defect edges were debeveled with a #15 scalpel blade.  Given the location of the defect, shape of the defect a keystone flap was deemed most appropriate.  Using a sterile surgical marker, an appropriate keystone flap was drawn incorporating the defect, outlining the appropriate donor tissue and placing the expected incisions within the relaxed skin tension lines where possible. The area thus outlined was incised deep to adipose tissue with a #15 scalpel blade.  The skin margins were undermined to an appropriate distance in all directions around the primary defect and laterally outward around the flap utilizing iris scissors. Z Plasty Text: The lesion was extirpated to the level of the fat with a #15 scalpel blade.  Given the location of the defect, shape of the defect and the proximity to free margins a Z-plasty was deemed most appropriate for repair.  Using a sterile surgical marker, the appropriate transposition arms of the Z-plasty were drawn incorporating the defect and placing the expected incisions within the relaxed skin tension lines where possible.    The area thus outlined was incised deep to adipose tissue with a #15 scalpel blade.  The skin margins were undermined to an appropriate distance in all directions utilizing iris scissors.  The opposing transposition arms were then transposed into place in opposite direction and anchored with interrupted buried subcutaneous sutures. Trilobed Flap Text: The defect edges were debeveled with a #15 scalpel blade.  Given the location of the defect and the proximity to free margins a trilobed flap was deemed most appropriate.  Using a sterile surgical marker, an appropriate trilobed flap drawn around the defect.    The area thus outlined was incised deep to adipose tissue with a #15 scalpel blade.  The skin margins were undermined to an appropriate distance in all directions utilizing iris scissors. Bcc Infiltrative Histology Text: Irregularly shaped cords and strands of basaloid keratinocytes infiltrate the dermis with a spiky growth pattern. The cells have scant cytoplasm and round dark nuclei. Mitotic figures and apoptotic bodies are evident. The nuclei at the periphery of the islands have a palisaded arrangement. The islands are associated with a fibromyxoid stroma and there is a cleft formation between some of the islands and stroma. Cartilage Graft Text: The defect edges were debeveled with a #15 scalpel blade.  Given the location of the defect, shape of the defect, the fact the defect involved a full thickness cartilage defect a cartilage graft was deemed most appropriate.  An appropriate donor site was identified, cleansed, and anesthetized. The cartilage graft was then harvested and transferred to the recipient site, oriented appropriately and then sutured into place.  The secondary defect was then repaired using a primary closure. Tumor Depth: Less than 6mm from granular layer and no invasion beyond the subcutaneous fat Scc Moderately Differentiated Histology Text: Arising from the epidermis is a proliferation of atypical keratinocytes with mitoses. Invasion into the dermis is noted. In areas the tumor is keratin producing and in other areas the tumor is less well differentiated. Cheiloplasty (Less Than 50%) Text: A decision was made to reconstruct the defect with a  cheiloplasty.  The defect was undermined extensively.  Additional obicularis oris muscle was excised with a 15 blade scalpel.  The defect was converted into a full thickness wedge, of less than 50% of the vertical height of the lip, to facilite a better cosmetic result.  Small vessels were then tied off with 5-0 monocyrl. The obicularis oris, superficial fascia, adipose and dermis were then reapproximated.  After the deeper layers were approximated the epidermis was reapproximated with particular care given to realign the vermilion border. Mastoid Interpolation Flap Text: A decision was made to reconstruct the defect utilizing an interpolation axial flap and a staged reconstruction.  A telfa template was made of the defect.  This telfa template was then used to outline the mastoid interpolation flap.  The donor area for the pedicle flap was then injected with anesthesia.  The flap was excised through the skin and subcutaneous tissue down to the layer of the underlying musculature.  The pedicle flap was carefully excised within this deep plane to maintain its blood supply.  The edges of the donor site were undermined.   The donor site was closed in a primary fashion.  The pedicle was then rotated into position and sutured.  Once the tube was sutured into place, adequate blood supply was confirmed with blanching and refill.  The pedicle was then wrapped with xeroform gauze and dressed appropriately with a telfa and gauze bandage to ensure continued blood supply and protect the attached pedicle. Graft Donor Site Bandage (Optional-Leave Blank If You Don't Want In Note): Steri-strips and a pressure bandage were applied to the donor site. Hemigard Intro: Due to skin fragility and wound tension, it was decided to use HEMIGARD adhesive retention suture devices to permit a linear closure. The skin was cleaned and dried for a 6cm distance away from the wound. Excessive hair, if present, was removed to allow for adhesion. Surgical Defect Width In Cm (Optional): 2.0 Dressing (No Sutures): dry sterile dressing Repair Hemostasis (Optional): Electrocautery Nostril Rim Text: The closure involved the nostril rim. Consent (Ear)/Introductory Paragraph: The rationale for Mohs was explained to the patient and consent was obtained. The risks, benefits and alternatives to therapy were discussed in detail. Specifically, the risks of ear deformity, infection, scarring, bleeding, prolonged wound healing, incomplete removal, allergy to anesthesia, nerve injury and recurrence were addressed. Prior to the procedure, the treatment site was clearly identified and confirmed by the patient. All components of Universal Protocol/PAUSE Rule completed. Surgeon Performing Repair (Optional): French Peters DO Consent 1/Introductory Paragraph: The rationale for Mohs was explained to the patient and consent was obtained. The risks, benefits and alternatives to therapy were discussed in detail. Specifically, the risks of infection, scarring, bleeding, prolonged wound healing, incomplete removal, allergy to anesthesia, nerve injury and recurrence were addressed. Prior to the procedure, the treatment site was clearly identified and confirmed by the patient. All components of Universal Protocol/PAUSE Rule completed. Consent Type: Consent 1 (Standard) Non-Graft Cartilage Fenestration Text: The cartilage was fenestrated with a 2mm punch biopsy to help facilitate healing. Complex Repair And Flap Additional Text (Will Appearing After The Standard Complex Repair Text): The complex repair was not sufficient to completely close the primary defect. The remaining additional defect was repaired with the flap mentioned below. Purse String (Intermediate) Text: Given the location of the defect and the characteristics of the surrounding skin a purse string intermediate closure was deemed most appropriate.  Undermining was performed circumfirentially around the surgical defect.  A purse string suture was then placed and tightened. Helical Rim Advancement Flap Text: The defect edges were debeveled with a #15 blade scalpel.  Given the location of the defect and the proximity to free margins (helical rim) a double helical rim advancement flap was deemed most appropriate.  Using a sterile surgical marker, the appropriate advancement flaps were drawn incorporating the defect and placing the expected incisions between the helical rim and antihelix where possible.  The area thus outlined was incised through and through with a #15 scalpel blade.  With a skin hook and iris scissors, the flaps were gently and sharply undermined and freed up. Postop Diagnosis: same Debridement Text: The wound edges were debrided prior to proceeding with the closure to facilitate wound healing. Surgeon/Pathologist Verbiage (Will Incorporate Name Of Surgeon From Intro If Not Blank): operated in two distinct and integrated capacities as the surgeon and pathologist. Burow's Advancement Flap Text: The defect edges were debeveled with a #15 scalpel blade.  Given the location of the defect and the proximity to free margins a Burow's advancement flap was deemed most appropriate.  Using a sterile surgical marker, the appropriate advancement flap was drawn incorporating the defect and placing the expected incisions within the relaxed skin tension lines where possible.    The area thus outlined was incised deep to adipose tissue with a #15 scalpel blade.  The skin margins were undermined to an appropriate distance in all directions utilizing iris scissors. Deep Sutures: 3-0 Biosyn Double O-Z Flap Text: The defect edges were debeveled with a #15 scalpel blade.  Given the location of the defect, shape of the defect and the proximity to free margins a Double O-Z flap was deemed most appropriate.  Using a sterile surgical marker, an appropriate transposition flap was drawn incorporating the defect and placing the expected incisions within the relaxed skin tension lines where possible. The area thus outlined was incised deep to adipose tissue with a #15 scalpel blade.  The skin margins were undermined to an appropriate distance in all directions utilizing iris scissors. Rhomboid Transposition Flap Text: The defect edges were debeveled with a #15 scalpel blade.  Given the location of the defect and the proximity to free margins a rhomboid transposition flap was deemed most appropriate.  Using a sterile surgical marker, an appropriate rhomboid flap was drawn incorporating the defect.    The area thus outlined was incised deep to adipose tissue with a #15 scalpel blade.  The skin margins were undermined to an appropriate distance in all directions utilizing iris scissors. Patient Name (Optional- Will Render 'the Patient' If Blank): Allison Patel Muscle Hinge Flap Text: The defect edges were debeveled with a #15 scalpel blade.  Given the size, depth and location of the defect and the proximity to free margins a muscle hinge flap was deemed most appropriate.  Using a sterile surgical marker, an appropriate hinge flap was drawn incorporating the defect. The area thus outlined was incised with a #15 scalpel blade.  The skin margins were undermined to an appropriate distance in all directions utilizing iris scissors. Hatchet Flap Text: The defect edges were debeveled with a #15 scalpel blade.  Given the location of the defect, shape of the defect and the proximity to free margins a hatchet flap was deemed most appropriate.  Using a sterile surgical marker, an appropriate hatchet flap was drawn incorporating the defect and placing the expected incisions within the relaxed skin tension lines where possible.    The area thus outlined was incised deep to adipose tissue with a #15 scalpel blade.  The skin margins were undermined to an appropriate distance in all directions utilizing iris scissors. Area M Indication Text: Tumors in this location are included in Area M (cheek, forehead, scalp, neck, jawline and pretibial skin).  Mohs surgery is indicated for tumors in these anatomic locations. Ftsg Text: The defect edges were debeveled with a #15 scalpel blade.  Given the location of the defect, shape of the defect and the proximity to free margins a full thickness skin graft was deemed most appropriate.  Using a sterile surgical marker, the primary defect shape was transferred to the donor site. The area thus outlined was incised deep to adipose tissue with a #15 scalpel blade.  The harvested graft was then trimmed of adipose tissue until only dermis and epidermis was left.  The skin margins of the secondary defect were undermined to an appropriate distance in all directions utilizing iris scissors.  The secondary defect was closed with interrupted buried subcutaneous sutures.  The skin edges were then re-apposed with running  sutures.  The skin graft was then placed in the primary defect and oriented appropriately. Abbe Flap (Lower To Upper Lip) Text: The defect of the upper lip was assessed and measured.  Given the location and size of the defect, an Abbe flap was deemed most appropriate.  Using a sterile surgical marker, an appropriate Abbe flap was measured and drawn on the lower lip. Local anesthesia was then infiltrated. A scalpel was then used to incise the upper lip through and through the skin, vermilion, muscle and mucosa, leaving the flap pedicled on the opposite side.  The flap was then rotated and transferred to the lower lip defect.  The flap was then sutured into place with a three layer technique, closing the orbicularis oris muscle layer with subcutaneous buried sutures, followed by a mucosal layer and an epidermal layer. V-Y Plasty Text: The defect edges were debeveled with a #15 scalpel blade.  Given the location of the defect, shape of the defect and the proximity to free margins an V-Y advancement flap was deemed most appropriate.  Using a sterile surgical marker, an appropriate advancement flap was drawn incorporating the defect and placing the expected incisions within the relaxed skin tension lines where possible.    The area thus outlined was incised deep to adipose tissue with a #15 scalpel blade.  The skin margins were undermined to an appropriate distance in all directions utilizing iris scissors. Cheek-To-Nose Interpolation Flap Text: A decision was made to reconstruct the defect utilizing an interpolation axial flap and a staged reconstruction.  A telfa template was made of the defect.  This telfa template was then used to outline the Cheek-To-Nose Interpolation flap.  The donor area for the pedicle flap was then injected with anesthesia.  The flap was excised through the skin and subcutaneous tissue down to the layer of the underlying musculature.  The interpolation flap was carefully excised within this deep plane to maintain its blood supply.  The edges of the donor site were undermined.   The donor site was closed in a primary fashion.  The pedicle was then rotated into position and sutured.  Once the tube was sutured into place, adequate blood supply was confirmed with blanching and refill.  The pedicle was then wrapped with xeroform gauze and dressed appropriately with a telfa and gauze bandage to ensure continued blood supply and protect the attached pedicle. Alar Island Pedicle Flap Text: The defect edges were debeveled with a #15 scalpel blade.  Given the location of the defect, shape of the defect and the proximity to the alar rim an island pedicle advancement flap was deemed most appropriate.  Using a sterile surgical marker, an appropriate advancement flap was drawn incorporating the defect, outlining the appropriate donor tissue and placing the expected incisions within the nasal ala running parallel to the alar rim. The area thus outlined was incised with a #15 scalpel blade.  The skin margins were undermined minimally to an appropriate distance in all directions around the primary defect and laterally outward around the island pedicle utilizing iris scissors.  There was minimal undermining beneath the pedicle flap. Bcc Micronodular Histology Text: Emanating from the epidermis and infiltrating the dermis are irregularly shaped islands of basaloid keratinocytes. The cells have scant cytoplasm and dark round nuclei. Mitotic figures and apoptotic bodies are evident. The nuclei at the periphery of the islands have a palisaded arrangement. The islands are associated with a fibromyxoid stroma and there is cleft formation between some of the islands and stroma. In areas the tumor breaks up into a small, micronodular, infiltrative growth pattern with deeper extension into the dermis. Banner Transposition Flap Text: The defect edges were debeveled with a #15 scalpel blade.  Given the location of the defect and the proximity to free margins a Banner transposition flap was deemed most appropriate.  Using a sterile surgical marker, an appropriate flap drawn around the defect. The area thus outlined was incised deep to adipose tissue with a #15 scalpel blade.  The skin margins were undermined to an appropriate distance in all directions utilizing iris scissors. Donor Site Anesthesia Type: same as repair anesthesia Detail Level: Detailed Mohs Histo Method Verbiage: Each section was then chromacoded and processed in the Mohs lab using the Mohs protocol and submitted for frozen section. Area L Indication Text: Tumors in this location are included in Area L (trunk and extremities).  Mohs surgery is indicated for larger tumors, or tumors with aggressive histologic features, in these anatomic locations. Bi-Rhombic Flap Text: The defect edges were debeveled with a #15 scalpel blade.  Given the location of the defect and the proximity to free margins a bi-rhombic flap was deemed most appropriate.  Using a sterile surgical marker, an appropriate rhombic flap was drawn incorporating the defect. The area thus outlined was incised deep to adipose tissue with a #15 scalpel blade.  The skin margins were undermined to an appropriate distance in all directions utilizing iris scissors. Mohs Case Number:  Mustarde Flap Text: The defect edges were debeveled with a #15 scalpel blade.  Given the size, depth and location of the defect and the proximity to free margins a Mustarde flap was deemed most appropriate.  Using a sterile surgical marker, an appropriate flap was drawn incorporating the defect. The area thus outlined was incised with a #15 scalpel blade.  The skin margins were undermined to an appropriate distance in all directions utilizing iris scissors. V-Y Flap Text: The defect edges were debeveled with a #15 scalpel blade.  Given the location of the defect, shape of the defect and the proximity to free margins a V-Y flap was deemed most appropriate.  Using a sterile surgical marker, an appropriate advancement flap was drawn incorporating the defect and placing the expected incisions within the relaxed skin tension lines where possible.    The area thus outlined was incised deep to adipose tissue with a #15 scalpel blade.  The skin margins were undermined to an appropriate distance in all directions utilizing iris scissors. Rotation Flap Text: The defect edges were debeveled with a #15 scalpel blade.  Given the location of the defect, shape of the defect and the proximity to free margins a rotation flap was deemed most appropriate.  Using a sterile surgical marker, an appropriate rotation flap was drawn incorporating the defect and placing the expected incisions within the relaxed skin tension lines where possible.    The area thus outlined was incised deep to adipose tissue with a #15 scalpel blade.  The skin margins were undermined to an appropriate distance in all directions utilizing iris scissors. Chonodrocutaneous Helical Advancement Flap Text: The defect edges were debeveled with a #15 scalpel blade.  Given the location of the defect and the proximity to free margins a chondrocutaneous helical advancement flap was deemed most appropriate.  Using a sterile surgical marker, the appropriate advancement flap was drawn incorporating the defect and placing the expected incisions within the relaxed skin tension lines where possible.    The area thus outlined was incised deep to adipose tissue with a #15 scalpel blade.  The skin margins were undermined to an appropriate distance in all directions utilizing iris scissors. Retention Suture Text: Retention sutures were placed to support the closure and prevent dehiscence. Scc Ka Subtype Histology Text: There is a symmetric, crateriform nodule with a central keratinous core. Buttress formation is noted at the peripheries of the nodule. The keratinocytes are enlarged with glassy, eosinophilic cytoplasm. The atypical keratinocytes infiltrate the dermis in an irregular fashion and are associated with inflammation including lymphomononuclear cells and eosinophils. Split-Thickness Skin Graft Text: The defect edges were debeveled with a #15 scalpel blade.  Given the location of the defect, shape of the defect and the proximity to free margins a split thickness skin graft was deemed most appropriate.  Using a sterile surgical marker, the primary defect shape was transferred to the donor site. The split thickness graft was then harvested.  The skin graft was then placed in the primary defect and oriented appropriately. Interpolation Flap Text: A decision was made to reconstruct the defect utilizing an interpolation axial flap and a staged reconstruction.  A telfa template was made of the defect.  This telfa template was then used to outline the interpolation flap.  The donor area for the pedicle flap was then injected with anesthesia.  The flap was excised through the skin and subcutaneous tissue down to the layer of the underlying musculature.  The interpolation flap was carefully excised within this deep plane to maintain its blood supply.  The edges of the donor site were undermined.   The donor site was closed in a primary fashion.  The pedicle was then rotated into position and sutured.  Once the tube was sutured into place, adequate blood supply was confirmed with blanching and refill.  The pedicle was then wrapped with xeroform gauze and dressed appropriately with a telfa and gauze bandage to ensure continued blood supply and protect the attached pedicle. Bcc  Morpheaform/Sclerosing Histology Text: Irregularly shaped cords and strands of basaloid keratinocytes are present within the dermis. In areas the cells assume a single cell strand formation with a highly-infiltrative growth pattern. The cells have scant cytoplasm and round dark nuclei. There are nodular aggregates of darkly staining basophilic cells exhibiting peripheral palisading and stromal retraction. The islands are associated with a dense morpheic stroma. H Plasty Text: Given the location of the defect, shape of the defect and the proximity to free margins a H-plasty was deemed most appropriate for repair.  Using a sterile surgical marker, the appropriate advancement arms of the H-plasty were drawn incorporating the defect and placing the expected incisions within the relaxed skin tension lines where possible. The area thus outlined was incised deep to adipose tissue with a #15 scalpel blade. The skin margins were undermined to an appropriate distance in all directions utilizing iris scissors.  The opposing advancement arms were then advanced into place in opposite direction and anchored with interrupted buried subcutaneous sutures. Bcc Histology Text: There were numerous aggregates of basaloid cells. Double Island Pedicle Flap Text: The defect edges were debeveled with a #15 scalpel blade.  Given the location of the defect, shape of the defect and the proximity to free margins a double island pedicle advancement flap was deemed most appropriate.  Using a sterile surgical marker, an appropriate advancement flap was drawn incorporating the defect, outlining the appropriate donor tissue and placing the expected incisions within the relaxed skin tension lines where possible.    The area thus outlined was incised deep to adipose tissue with a #15 scalpel blade.  The skin margins were undermined to an appropriate distance in all directions around the primary defect and laterally outward around the island pedicle utilizing iris scissors.  There was minimal undermining beneath the pedicle flap. Lentigo Maligna Histology Text: There is an intraepidermal melanocytic proliferation arranged as nests as well as individual cells. The individual cells are crowded along the dermal-epidermal junction with well-developed confluent growth. Pagetoid migration to the upper levels of the epidermis is noted. The individual melanocytes are enlarged and hyperchromatic with irregular nuclear contours and coarse chromatin. Within the dermis there are rare nests of similar appearing melanocytes that are associated with fibroplasia, mild chronic inflammation, and solar elastosis. Bilobed Flap Text: The defect edges were debeveled with a #15 scalpel blade.  Given the location of the defect and the proximity to free margins a bilobe flap was deemed most appropriate.  Using a sterile surgical marker, an appropriate bilobe flap drawn around the defect.    The area thus outlined was incised deep to adipose tissue with a #15 scalpel blade.  The skin margins were undermined to an appropriate distance in all directions utilizing iris scissors. Vermilion Border Text: The closure involved the vermilion border. Suturegard Body: The suture ends were repeatedly re-tightened and re-clamped to achieve the desired tissue expansion. Consent (Spinal Accessory)/Introductory Paragraph: The rationale for Mohs was explained to the patient and consent was obtained. The risks, benefits and alternatives to therapy were discussed in detail. Specifically, the risks of damage to the spinal accessory nerve, infection, scarring, bleeding, prolonged wound healing, incomplete removal, allergy to anesthesia, and recurrence were addressed. Prior to the procedure, the treatment site was clearly identified and confirmed by the patient. All components of Universal Protocol/PAUSE Rule completed. Xenograft Text: The defect edges were debeveled with a #15 scalpel blade.  Given the location of the defect, shape of the defect and the proximity to free margins a xenograft was deemed most appropriate.  The graft was then trimmed to fit the size of the defect.  The graft was then placed in the primary defect and oriented appropriately. Closure 2 Information: This tab is for additional flaps and grafts, including complex repair and grafts and complex repair and flaps. You can also specify a different location for the additional defect, if the location is the same you do not need to select a new one. We will insert the automated text for the repair you select below just as we do for solitary flaps and grafts. Please note that at this time if you select a location with a different insurance zone you will need to override the ICD10 and CPT if appropriate. Where Do You Want The Question To Include Opioid Counseling Located?: Case Summary Tab Medical Necessity Statement: Based on my medical judgement, Mohs surgery is the most appropriate treatment for this cancer compared to other treatments. Intermediate Repair And Flap Additional Text (Will Appearing After The Standard Complex Repair Text): The intermediate repair was not sufficient to completely close the primary defect. The remaining additional defect was repaired with the flap mentioned below.

## 2023-08-25 ENCOUNTER — NON-APPOINTMENT (OUTPATIENT)
Age: 73
End: 2023-08-25

## 2023-08-29 NOTE — REVIEW OF SYSTEMS
[facility] Inpatient History & Physical      Chief Complaint   Patient presents with   • Trauma     Pt presents via [facility] from [facility] after an unwitnessed fall around 1115am. Pt denies any complaints. States that he felt dizzy and fell. Pt is on Eliquis.        History Of Present Illness  Art Izquierdo is a 74 year old male w/ PMH of hypertension, type 2 diabetes, HFmrEF, CAD s/p CABG, atrial fibrillation on eliquis, hx of cva, BPH, depression, dementia and recurrent falls with recent T12 fracture who presents from SNF for evaluation after unwitnessed fall. Patient reports he was trying to transfer from his wheelchair to a walker and fell in to a sitting position. He states he was alone in a room when fall happened. He denies head trauma or LOC. He states his knees locked causing the fall. He denies lightheadedness or dizziness prior to his fall. He was recently hospitalized from 8/20-8/23/23 after fall and was found to have a subacute T12 fracture, neurosurgery was consulted and recommended brace. He denies back pain. He denies fever, chills, nausea, vomiting, chest pain or shortness of breath.       Past Medical History  Past Medical History:   Diagnosis Date   • AFIB    • Arthritis    • CAD s/p CABG 11/2020 LIMA to LAD    • CVA, old, cognitive deficits    • DM    • Former smoker    • Fracture    • GERD    • HTN    • Hyperlipidemia    • Obesity    • PAD    • Psoriasis         Surgical History  Past Surgical History:   Procedure Laterality Date   • Bypass graft Left 12/20/2020    distal SFA to distal AT bypass with reverse grade AV.   • Coronary artery bypass graft  11/2020    Hx of 1v LIMA to LAD, MELI ligation, and MAZE   • Fracture surgery     • Reconstruction atria extensive maze w bypass          Social History  Social History     Tobacco Use   • Smoking status: Former     Current packs/day: 0.00     Types: Cigarettes     Quit date: 1968     Years since quitting:  55.6   • Smokeless tobacco: Never   • Tobacco comments:     Patient smoked 10 cigarettes a day for 43 years.    Vaping Use   • Vaping Use: never used   Substance Use Topics   • Alcohol use: Not Currently     Comment: Quit in 2011.  Used to drink beer once a week.    • Drug use: Not Currently     Types: Marijuana, Cocaine     Comment: Used to use drugs once a week for 20 years  (Patient was in his 30's and stopped in his 50's) .       Family History  Family History   Problem Relation Age of Onset   • Patient is unaware of any medical problems Mother    • Patient is unaware of any medical problems Father         Allergies  ALLERGIES:  Patient has no known allergies.    Medications  (Not in a hospital admission)      Review of Systems  10 point ROS otherwise negative besides HPI.      Last Recorded Vitals  Vitals with min/max:  There is no height or weight on file to calculate BMI.    Vital Last Value 24 Hour Range   Temperature 97.7 °F (36.5 °C) (08/29/23 1540) Temp  Min: 97.7 °F (36.5 °C)  Max: 97.7 °F (36.5 °C)   Pulse (!) 53 (08/29/23 1655) Pulse  Min: 53  Max: 69   Respiratory 18 (08/29/23 1655) Resp  Min: 14  Max: 18   Non-Invasive  Blood Pressure (!) 154/72 (08/29/23 1655) BP  Min: 152/83  Max: 169/85   Pulse Oximetry 100 % (08/29/23 1655) SpO2  Min: 96 %  Max: 100 %   Arterial   Blood Pressure   No data recorded      Physical Exam  General:  Alert, NAD   Head:  No signs of head trauma.  Eyes:  Extraocular motions intact.    Ears:  Hearing grossly intact.  Neck:  No adenopathy.     Chest:  Clear to auscultation bilaterally.    Cardiac:  Irregular irregular.  Abdomen:  Soft, without detectable tenderness.  No sign of distention.    Ext:  No pedal edema.    Psychiatric:  Mood appropriate.   Skin: psoriatic lesions on bilateral upper/lower extremities    Imaging  CXR personally reviewed:  CT HEAD WO CONTRAST   Final Result by Pepe Beck MD (08/29 1535)      No acute intracranial abnormality.      Dictated by:  KETTY LUI M.D.   Dictated on: 8/29/2023 3:14 PM          PEPE DEE MD, have reviewed the images and report and concur with   these findings interpreted by KETTY LUI M.D..      Electronically Signed by: PEPE RODRIGUEZ MD    Signed on: 8/29/2023 3:35 PM    Workstation ID: [id number]      CT CERVICAL SPINE WO CONTRAST   Final Result by Pepe Rodriguez MD (08/29 5529)   No acute fracture or acute malalignment.         Electronically Signed by: PEPE RODRIGUEZ MD    Signed on: 8/29/2023 3:19 PM    Workstation ID: [id number]      CT CHEST ABDOMEN PELVIS WO CONTRAST   Final Result by Crista Kessler MD (08/29 4482)      No new traumatic injury in the chest, abdomen, or pelvis, allowing for   limitations of noncontrast CT.      Redemonstrated burst fracture of T12 vertebral body and right 12th rib   fracture.      Dilated main pulmonary artery measuring 3.5 cm, suggestive of pulmonary   arterial hypertension.      Dictated by: KETTY LUI M.D.   Dictated on: 8/29/2023 3:27 PM          CRISTA DEE MD, have reviewed the images and report and concur   with these findings interpreted by KETTY LUI M.D..      Electronically Signed by: CRISTA KESSLER MD    Signed on: 8/29/2023 3:57 PM    Workstation ID: [id number]      XR CHEST PA OR AP 1 VIEW   Final Result by Jeffery Gomez MD (08/29 1439)   FINDINGS AND IMPRESSION: Mid sternotomy wires and left atrial appendage   clip unchanged in position.   Cardiomediastinal silhouette is borderline enlarged, smaller than   previously noted. Mild prominence perihilar interstitial lung markings is   essentially unchanged. No new airspace opacities.   The lungs are well expanded. There are no airspace or interstitial   opacities. There is no evidence of pneumothorax.  Trachea is midline.  The   costophrenic angles are clear.     Visualized osseous structures appear unremarkable.         Electronically Signed by: JEFFERY GOMEZ M.D.    Signed on:  8/29/2023 3:11 PM    Workstation ID: [id number]      XR PELVIS 1 VIEW   Final Result by Rossy Michael MD (08/29 1510)   Portable frontal view of the pelvis. SI joints, symphysis pubis   and hip joints are congruent. Vascular calcifications. No acute or healing   fracture.        Electronically Signed by: ROSSY MICHAEL M.D.    Signed on: 8/29/2023 3:10 PM    Workstation ID: [id number]      CT HEAD WO CONTRAST    (Results Pending)       Cardiac studies:   ECG personally reviewed:   Encounter Date: 08/29/23   Electrocardiogram 12-Lead   Result Value    Ventricular Rate EKG/Min (BPM) 48    Atrial Rate (BPM) 46    QRS-Interval (MSEC) 138    QT-Interval (MSEC) 512    QTc 457    R Axis (Degrees) -60    T Axis (Degrees) -154    REPORT TEXT      Atrial fibrillation  with slow ventricular response  Left axis deviation  Left bundle branch block  Abnormal ECG  When compared with ECG of  20-AUG-2023 17:21,  QRS axis  shifted left  T wave inversion more evident in  Anterolateral leads         Labs   Recent Results (from the past 24 hour(s))   Prothrombin Time (INR/PT)    Collection Time: 08/29/23  2:53 PM   Result Value Ref Range    Protime- PT 11.4 9.7 - 11.8 sec    INR 1.1     Partial Thromboplastin Time (PTT)    Collection Time: 08/29/23  2:53 PM   Result Value Ref Range    PTT 30 22 - 30 sec   Basic Metabolic Panel    Collection Time: 08/29/23  2:53 PM   Result Value Ref Range    Fasting Status      Sodium 141 135 - 145 mmol/L    Potassium 4.0 3.4 - 5.1 mmol/L    Chloride 103 97 - 110 mmol/L    Carbon Dioxide 29 21 - 32 mmol/L    Anion Gap 13 7 - 19 mmol/L    Glucose 200 (H) 70 - 99 mg/dL    BUN 22 (H) 6 - 20 mg/dL    Creatinine 1.15 0.67 - 1.17 mg/dL    Glomerular Filtration Rate 68 >=60    BUN/Cr 19 7 - 25    Calcium 8.5 8.4 - 10.2 mg/dL   Lactic Acid Venous With Reflex    Collection Time: 08/29/23  2:53 PM   Result Value Ref Range    Lactate, Venous 1.9 0.0 - 2.0 mmol/L   Alcohol    Collection Time: 08/29/23   2:53 PM   Result Value Ref Range    Alcohol None Detected None Detected mg/dL   TYPE/SCREEN    Collection Time: 08/29/23  2:53 PM   Result Value Ref Range    ABO/RH(D) O Rh Positive     ANTIBODY SCREEN Negative     TYPE AND SCREEN EXPIRATION DATE 09/01/2023 23:59    TROPONIN I, HIGH SENSITIVITY    Collection Time: 08/29/23  2:53 PM   Result Value Ref Range    Troponin I, High Sensitivity 20 <77 ng/L   CBC with Automated Differential (performable only)    Collection Time: 08/29/23  2:53 PM   Result Value Ref Range    WBC 9.6 4.2 - 11.0 K/mcL    RBC 4.09 (L) 4.50 - 5.90 mil/mcL    HGB 11.2 (L) 13.0 - 17.0 g/dL    HCT 34.7 (L) 39.0 - 51.0 %    MCV 84.8 78.0 - 100.0 fl    MCH 27.4 26.0 - 34.0 pg    MCHC 32.3 32.0 - 36.5 g/dL    RDW-CV 13.7 11.0 - 15.0 %    RDW-SD 41.9 39.0 - 50.0 fL     140 - 450 K/mcL    NRBC 0 <=0 /100 WBC    Neutrophil, Percent 61 %    Lymphocytes, Percent 26 %    Mono, Percent 8 %    Eosinophils, Percent 4 %    Basophils, Percent 1 %    Immature Granulocytes 0 %    Absolute Neutrophils 5.8 1.8 - 7.7 K/mcL    Absolute Lymphocytes 2.5 1.0 - 4.0 K/mcL    Absolute Monocytes 0.8 0.3 - 0.9 K/mcL    Absolute Eosinophils  0.4 0.0 - 0.5 K/mcL    Absolute Basophils 0.1 0.0 - 0.3 K/mcL    Absolute Immature Granulocytes 0.0 0.0 - 0.2 K/mcL   Electrocardiogram 12-Lead    Collection Time: 08/29/23  3:42 PM   Result Value Ref Range    Ventricular Rate EKG/Min (BPM) 48     Atrial Rate (BPM) 46     QRS-Interval (MSEC) 138     QT-Interval (MSEC) 512     QTc 457     R Axis (Degrees) -60     T Axis (Degrees) -154     REPORT TEXT       Atrial fibrillation  with slow ventricular response  Left axis deviation  Left bundle branch block  Abnormal ECG  When compared with ECG of  20-AUG-2023 17:21,  QRS axis  shifted left  T wave inversion more evident in  Anterolateral leads         Assessment/Plan      S/p Fall  -presents after unwitnessed fall at NH, likely mechanical; hx of recurrent falls with prior  hospitalizations   -CT head: no acute findings  -CT cervical spine: No acute fracture or acute malalignment.  -CT chest abd pelvis: No new traumatic injury in the chest, abdomen, or pelvis. Redemonstrated burst fracture of T12 vertebral body and right 12th rib fracture. Dilated main pulmonary artery measuring 3.5 cm, suggestive of pulmonary arterial hypertension.  -xray pelvis: No acute or healing fracture.    -ekg: HR 46, atrial fibrillation, LBBB. Troponin x 1 negative.   -Echo 8/2023: EF 45%, moderate concentric hypertrophy, mild AR, mild AS  -monitor on telemetry   -repeat CT head with no acute intracranial abnormality.  -physical therapy, occupational therapy       Recent T12 fracture  -recently hospitalized from 8/20-8/23/23 after fall and was found to have a subacute T12 fracture, neurosurgery  recommended brace  -physical therapy, occupational therapy       Hypertensive heart disease with chronic combined systolic and diastolic congestive heart failure (CMD)  -Echo 8/2023: EF 45%, moderate concentric hypertrophy, mild AR, mild AS  -monitor I's/O's   -continue metoprolol, lisinopril, lasix      Type 2 diabetes mellitus without complication, without long-term current use of insulin (CMD)  -continue lantus 34 units at bedtime  -cover with ISS       Hyperlipidemia   -continue atorvastatin       GERD   -continue pepcid       Chronic atrial fibrillation (CMD)  -continue amiodarone, eliquis       CAD s/p CABG  -continue atorvastatin       History of lacunar cerebrovascular accident (CVA)  -continue atorvastatin       Rheumatoid arthritis (CMD)  -no active issues       Psoriasis  -continue triamcinolone ointment      Depression   -continue cymbalta      Dementia  -continue aricept      BPH   -continue flomax, proscar       Anemia  -monitor H/H      Dvt prophylaxis  -eliquis     based on the patient's presentation on admission, I expect the patient to require at least 2 midnights of medically necessary Hospital  services    Current Facility-Administered Medications   Medication Dose Route Frequency Provider Last Rate Last Admin   • diphtheria-pertussis (acellular)-tetanus (BOOSTRIX) vaccine 0.5 mL  0.5 mL Intramuscular Once Hemant Logan MD         Current Outpatient Medications   Medication Sig Dispense Refill   • insulin glargine (Basaglar KwikPen) 100 UNIT/ML pen-injector Inject 40 Units into the skin nightly. Prime 2 units before each dose.     • furosemide (LASIX) 20 MG tablet Take 20 mg by mouth.     • metoPROLOL tartrate (LOPRESSOR) 50 MG tablet Take 50 mg by mouth 2 times daily.     • DULoxetine (CYMBALTA) 60 MG capsule TAKE ONE CAPSULE BY MOUTH ONCE DAILY 90 capsule 1   • triamcinolone (ARISTOCORT) 0.1 % ointment APPLY 2-4 GM TWICE DAILY FOR PSORIASIS, DO NOT PUT ON GROIN,FACE,ARMPITS. 454 g 5   • OneTouch Ultra test strip TEST 3 TO 4 TIMES DAILY AS NEEDED 200 each 1   • Eliquis 5 MG Tab TAKE ONE TABLET BY MOUTH EVERY 12 HOURS. 180 tablet 3   • Alcohol Swabs (Easy Touch Alcohol Prep Medium) 70 % Pads USE TO SANITIZE BEFORE AND AFTER USING THE LANCETS TO CHECK BLOOD SUGAR 3 - 4 TIMES DAILY AS DIRECTED 600 each 1   • donepezil (ARICEPT) 5 MG tablet TAKE ONE TABLET BY MOUTH AT BEDTIME. (Patient taking differently: Take 10 mg by mouth nightly.) 90 tablet 3   • atorvastatin (LIPITOR) 40 MG tablet TAKE ONE TABLET BY MOUTH DAILY 90 tablet 3   • AMIODarone (PACERONE) 200 MG tablet TAKE ONE TABLET BY MOUTH DAILY. 90 tablet 1   • lidocaine (LIDOCARE) 4 % patch Place 1 patch onto the skin every 24 hours. 30 patch 0   • polyethylene glycol (MIRALAX) 17 g packet Take 17 g by mouth daily as needed (constipation). Stir and dissolve powder in any 4 to 8 ounces of beverage, then drink. 30 each 0   • acetaminophen (TYLENOL) 325 MG tablet Take 2 tablets by mouth every 6 hours as needed for Pain. 30 tablet 0   • insulin lispro 100 UNIT/ML injectable solution Inject 15 Units into the skin 3 times daily (before meals). 10 mL 12   •  Easy Comfort Lancets Misc TEST THREE TO 4 TIMES DAILY AS NEEDED 200 each 3   • lisinopril (ZESTRIL) 20 MG tablet TAKE ONE TABLET BY MOUTH DAILY. 90 tablet 3   • Aspirin Low Dose 81 MG EC tablet TAKE 1 TABLET BY MOUTH ONCE DAILY 90 tablet 3   • Comfort EZ Pen Needles 32G X 4 MM Misc USE AS DIRECTED FOUR TIMES A  each 3   • finasteride (PROSCAR) 5 MG tablet TAKE ONE TABLET BY MOUTH DAILY. 90 tablet 0   • tamsulosin (FLOMAX) 0.4 MG Cap TAKE ONE CAPSULE BY MOUTH DAILY AFTER A MEAL. 90 capsule 0   • Continuous Blood Gluc Transmit (Dexcom G6 Transmitter) Misc 1 each continuous. Order: Dexcom G6 Transmitter #1 every 3 months +3 refills, Dx: E11.52, injects insulin QID and checks BS QID 1 each 3   • Continuous Blood Gluc Sensor (Dexcom G6 Sensor) Misc Insert new sensor every 10 days.  Order: Dexcom G6 Sensor 3 sensors per box every 30 days +3 refills, Dx: E11.52, injects insulin QID and checks BS QID 3 each 3   • Continuous Blood Gluc  (Dexcom G6 ) Device 1 Device 1 time. Order: Dexcom G6  #1 once a year, Dx: E11.52, injects insulin QID and checks BS QID 1 each 0   • famotidine (PEPCID) 20 MG tablet TAKE 1 TABLET BY MOUTH ONCE DAILY 90 tablet 3   • Multiple Vitamins-Minerals (vitamin - therapeutic multivitamins w/minerals) tablet Take 1 tablet by mouth daily.     • Omega-3 Fatty Acids (Fish Oil) 1000 MG capsule Take 1 g by mouth daily.     • Ascorbic Acid (vitamin C) 500 MG tablet Take 1 tablet by mouth daily. 90 tablet 1   • Blood Glucose Monitoring Suppl (EMBRACE PRO GLUCOSE METER) Device AS DIRECTED 1 Device 0       Advance Care Planing  I have evaluated the patient who has the capacity to make healthcare decisions.  This patient has/has not have an Advance Care directive document in Epic  Code Status Information     Code Status    Prior         Discussed meaning of DNR/DNI status vs Full Code status as well as plan for advanced directive vs. Surrogate and what this entails.   Discussed  CPR, intubation, electrical cardioversion, vasopressors and antiarrhythmics.   Also made clear that code status can change at any time. Patient can change his/her mind any time.    Patient/family understands.  The Patient/POA has decided that the patient Code Status is :   Code Status Information     Code Status    Prior        All questions answered.  Advanced care planning care time 16 minutes      Primary Care Physician  Nazario Dale MD    Total amount of time spent with the patient: 75 minutes. Greater than 50% of the time spent reviewing the patient records, coordinating patient care plan and discussing the above care plan  with the patient.    Discussed with Patient.  Rozina Molina MD  8/29/2023         [Nasal Congestion] : nasal congestion [Cough] : cough [Sputum] : sputum  [Nocturia] : nocturia [As Noted in HPI] : as noted in HPI [Snoring] : snoring [Negative] : Psychiatric [Fever] : no fever [Chills] : no chills [Fatigue] : no fatigue [Poor Appetite] : normal appetite  [Dry Eyes] : no dryness of the eyes [Eye Irritation] : no ~T irritation of the eyes [Ear Disturbance] : no ear disturbance [Postnasal Drip] : no postnasal drip [Sinus Problems] : no sinus problems [Sore Throat] : no sore throat [Dry Mouth] : no dry mouth [Hemoptysis] : no hemoptysis [Dyspnea] : no dyspnea [Chest Tightness] : no chest tightness [Pleuritic Pain] : no pleuritic pain [Wheezing] : no wheezing [Hypertension] : no ~T hypertension [Hypotension] : no hypotension [Chest Discomfort] : no chest discomfort [PND] : no PND [Orthopnea] : no orthopnea [Dysrhythmia] : no dysrhythmia [Murmurs] : no murmurs were heard [Palpitations] : no palpitations [Edema] : ~T edema was not present [Watery Eyes] : no discharge from the eyes [Nasal Discharge] : no nasal discharge [Hives] : no urticaria was observed [Angioedema] : no angioedema [Heartburn] : no heartburn [Reflux] : no reflux [Indigestion] : no indigestion [Dysphagia] : no dysphagia [Nausea] : no nausea [Vomiting] : no vomiting [Constipation] : no constipation [Diarrhea] : no diarrhea [Rash] : no [unfilled] rash [Itch] : no itching [Headache] : no headache [Dizziness] : no dizziness [Syncope] : no fainting [FreeTextEntry1] : wife says he snores

## 2023-08-30 ENCOUNTER — APPOINTMENT (OUTPATIENT)
Dept: GASTROENTEROLOGY | Facility: CLINIC | Age: 73
End: 2023-08-30
Payer: MEDICARE

## 2023-08-30 VITALS
OXYGEN SATURATION: 96 % | HEIGHT: 61 IN | HEART RATE: 75 BPM | WEIGHT: 185 LBS | BODY MASS INDEX: 34.93 KG/M2 | DIASTOLIC BLOOD PRESSURE: 84 MMHG | TEMPERATURE: 98 F | SYSTOLIC BLOOD PRESSURE: 165 MMHG

## 2023-08-30 DIAGNOSIS — R63.4 ABNORMAL WEIGHT LOSS: ICD-10-CM

## 2023-08-30 DIAGNOSIS — Z86.010 PERSONAL HISTORY OF COLONIC POLYPS: ICD-10-CM

## 2023-08-30 DIAGNOSIS — K59.00 CONSTIPATION, UNSPECIFIED: ICD-10-CM

## 2023-08-30 PROCEDURE — 99204 OFFICE O/P NEW MOD 45 MIN: CPT

## 2023-09-02 NOTE — HISTORY OF PRESENT ILLNESS
[FreeTextEntry1] : The patient is a 72-year-old male with past medical history significant for hypertension, hypercholesterolemia, diabetes mellitus, s/p CVA on Metoprolol and COPD with lung nodules who was referred to my office by Dr. Kenney Ruiz for constipation, change in bowel habits and change in caliber of stool. The patient also admits to having weight loss.  The patient had a history of colonic polyps.   I was asked to render an opinion for consultation for the above complaints.   The patient states that he is feeling uncomfortable x 2 months.  The patient denies any abdominal pain.  The patient denies any abdominal gas and bloating.  The patient denies any nausea or vomiting.  The patient denies any gastroesophageal reflux disease or dysphagia. The patient complains of shortness of breath secondary to COPD but denies any atypical chest pain, shortness of breath or palpitations.  The patient denies any diaphoresis. The patient complains of constipation but denies any diarrhea.  The patient has 1 bowel movement every 1 to 3 days. The patient complains of a change in bowel habits.  The patient complains of a change in caliber of stool.   The patient denies having mucus discharge with the bowel movements.  The patient denies any bright red blood per rectum, melena or hematemesis.  The patient denies any rectal pain or rectal pruritus. The patient complains of weight loss but denies any anorexia.  The patient admits to losing 40 pounds over the past several years. The patient attributes the weight loss to recent change in diet and metformin. He denies any fevers or chills.  The patient denies any jaundice or pruritus.  The patient complains of chronic lower back pain.  The patient admits to having a prior colonoscopy performed by another gastroenterologist, Dr. Kie Newton over 15 years ago.  According to the patient, the colonoscopic findings revealed colonic polyps. The patient admits to a family history of GI problems.  The patient's mother had a history of diverticulitis.  (+) smoking 1 PPD x 55 years, (+) ETOH abuse, (-) IVDA  Physical Exam: General Appearance: Well developed, overweight, no acute distress ENT:  nose clear, ears unremarkable Eyes: No enteric sclera, conjunctiva clear. Neck: Supple, without masses  Respiratory: Breath sounds equal and bilateral, no wheezing no rales or rhonchi Cardiovascular: S1-S2 audible, no murmur, no rubs or gallops GI: (+) BS, soft, nontender, no rebound, no guarding, no masses Liver: liver edge palpated Rectal: not done Musculo-skeletal: Poor motor strength, poor range of motion on right side, normal appearing extremities Skin: Normal appearing skin, no jaundice, no rashes or nodules Neurological: without focal motor or sensory deficits Patient is moving all extremities spontaneously and to command with normal muscle strength alert and oriented X3 Psychiatric: Good affect, not depressed, not anxious

## 2023-09-02 NOTE — ASSESSMENT
[FreeTextEntry1] : Constipation: The patient complains of constipation. I recommend a high-fiber diet. I recommend a trial of a probiotic such as Align once a day. I recommend a trial of Metamucil once a day for fiber supplementation. I recommend a trial of Miralax 1 packet once a day for the constipation.   The patient agreed and will followup to reassess the symptoms.   Weight Loss:  The patient complains of weight loss but denies any anorexia.  The patient admits to losing 40 pounds over the past several years. The patient attributes the weight loss to recent change in diet and metformin.  History of Colonic Polyps: The patient has a history of colonic polyps.  I recommend a repeat colonoscopy to reassess for colonic polyps.  The patient was told of the risks and benefits of the procedure.  The patient was told of the risks of perforation, emergency surgery, bleeding, infections and missed lesions.  The patient is to be on a clear liquid diet the entire day prior to the procedure. The patient is to complete the entire prescribed bowel prep the day prior to the procedure as directed. The patient is told not to drive, drink alcohol, use recreational drugs, exercise, or work the day of the procedure.  The patient was told of the need for an escort to accompany the patient home after the procedure. The patient is aware that the procedure may be cancelled if they fail to follow the directions.  The patient agreed and will schedule for the procedure. The patient can take the antihypertensive medication with a sip of water one hour prior to the procedure. The patient is to hold the diabetic medication the day before and the morning of the procedure. The patient is to be n.p.o. after midnight and bowel prep was given.  The patient is to return for the procedure.  Colonoscopy: I recommend a colonoscopy to assess the symptoms and for colonic polyps.  The patient was told of the risks and benefits of the procedure.  The patient was told of the risks of perforation, emergency surgery, bleeding, infections and missed lesions.  The patient is to be on a clear liquid diet the entire day prior to the procedure. The patient is to complete the entire prescribed bowel prep the day prior to the procedure as directed. The patient is told not to drive, drink alcohol, use recreational drugs, exercise, or work the day of the procedure.  The patient was told of the need for an escort to accompany the patient home after the procedure. The patient is aware that the procedure may be cancelled if they fail to follow the directions.  The patient agreed and will schedule for the procedure. The patient can take the antihypertensive medication with a sip of water one hour prior to the procedure. The patient is to hold the diabetic medication the day before and the morning of the procedure. The patient is to be n.p.o. after midnight and bowel prep was given.  The patient is to return for the procedure. Follow-up: The patient is to follow-up in the office in 4 weeks to reassess the symptoms. The patient was told to call the office if any further problems.

## 2023-09-19 ENCOUNTER — NON-APPOINTMENT (OUTPATIENT)
Age: 73
End: 2023-09-19

## 2023-09-20 ENCOUNTER — NON-APPOINTMENT (OUTPATIENT)
Age: 73
End: 2023-09-20

## 2023-09-20 ENCOUNTER — APPOINTMENT (OUTPATIENT)
Dept: THORACIC SURGERY | Facility: CLINIC | Age: 73
End: 2023-09-20
Payer: MEDICARE

## 2023-09-20 VITALS
HEART RATE: 80 BPM | BODY MASS INDEX: 29.99 KG/M2 | HEIGHT: 65 IN | DIASTOLIC BLOOD PRESSURE: 78 MMHG | OXYGEN SATURATION: 88 % | WEIGHT: 180 LBS | RESPIRATION RATE: 18 BRPM | SYSTOLIC BLOOD PRESSURE: 144 MMHG

## 2023-09-20 PROCEDURE — 99204 OFFICE O/P NEW MOD 45 MIN: CPT

## 2023-09-20 PROCEDURE — 99205 OFFICE O/P NEW HI 60 MIN: CPT

## 2023-09-20 RX ORDER — POLYETHYLENE GLYCOL 3350 17 G/17G
17 POWDER, FOR SOLUTION ORAL DAILY
Qty: 30 | Refills: 3 | Status: COMPLETED | COMMUNITY
Start: 2023-08-30 | End: 2023-09-20

## 2023-09-20 RX ORDER — METFORMIN HYDROCHLORIDE 850 MG/1
850 TABLET, COATED ORAL
Refills: 0 | Status: ACTIVE | COMMUNITY

## 2023-09-20 RX ORDER — OLOPATADINE HYDROCHLORIDE 665 UG/1
0.6 SPRAY, METERED NASAL
Qty: 3 | Refills: 1 | Status: COMPLETED | COMMUNITY
Start: 2018-04-02 | End: 2023-09-20

## 2023-09-20 RX ORDER — LISINOPRIL 20 MG/1
20 TABLET ORAL
Refills: 0 | Status: ACTIVE | COMMUNITY

## 2023-09-20 RX ORDER — UMECLIDINIUM BROMIDE AND VILANTEROL TRIFENATATE 62.5; 25 UG/1; UG/1
62.5-25 POWDER RESPIRATORY (INHALATION)
Qty: 3 | Refills: 0 | Status: COMPLETED | COMMUNITY
Start: 2019-08-20 | End: 2023-09-20

## 2023-09-20 RX ORDER — ATORVASTATIN CALCIUM 40 MG/1
40 TABLET, FILM COATED ORAL
Refills: 0 | Status: ACTIVE | COMMUNITY

## 2023-09-20 RX ORDER — OLOPATADINE HYDROCHLORIDE 665 UG/1
0.6 SPRAY, METERED NASAL
Qty: 3 | Refills: 1 | Status: COMPLETED | COMMUNITY
Start: 2017-07-13 | End: 2023-09-20

## 2023-09-20 RX ORDER — POLYETHYLENE GLYCOL 3350 AND ELECTROLYTES WITH LEMON FLAVOR 236; 22.74; 6.74; 5.86; 2.97 G/4L; G/4L; G/4L; G/4L; G/4L
236 POWDER, FOR SOLUTION ORAL
Qty: 1 | Refills: 0 | Status: COMPLETED | COMMUNITY
Start: 2023-08-30 | End: 2023-09-20

## 2023-09-20 RX ORDER — FOLIC ACID 1 MG/1
1 TABLET ORAL
Refills: 0 | Status: ACTIVE | COMMUNITY

## 2023-09-20 RX ORDER — LEVETIRACETAM 750 MG/1
750 TABLET, FILM COATED ORAL
Qty: 12 | Refills: 0 | Status: COMPLETED | COMMUNITY
Start: 2017-03-24 | End: 2023-09-20

## 2023-09-20 RX ORDER — FLUTICASONE FUROATE, UMECLIDINIUM BROMIDE AND VILANTEROL TRIFENATATE 200; 62.5; 25 UG/1; UG/1; UG/1
200-62.5-25 POWDER RESPIRATORY (INHALATION)
Qty: 3 | Refills: 1 | Status: COMPLETED | COMMUNITY
Start: 2022-10-04 | End: 2023-09-20

## 2023-09-20 RX ORDER — METOPROLOL TARTRATE 50 MG/1
50 TABLET, FILM COATED ORAL
Refills: 0 | Status: ACTIVE | COMMUNITY

## 2023-09-20 RX ORDER — SODIUM SULFATE, MAGNESIUM SULFATE, AND POTASSIUM CHLORIDE 17.75; 2.7; 2.25 G/1; G/1; G/1
1479-225-188 TABLET ORAL TWICE DAILY
Qty: 24 | Refills: 0 | Status: COMPLETED | COMMUNITY
Start: 2023-08-30 | End: 2023-09-20

## 2023-09-20 RX ORDER — FLUTICASONE FUROATE, UMECLIDINIUM BROMIDE AND VILANTEROL TRIFENATATE 200; 62.5; 25 UG/1; UG/1; UG/1
200-62.5-25 POWDER RESPIRATORY (INHALATION)
Qty: 3 | Refills: 1 | Status: COMPLETED | COMMUNITY
Start: 2022-04-06 | End: 2023-09-20

## 2023-09-20 RX ORDER — IPRATROPIUM BROMIDE AND ALBUTEROL 20; 100 UG/1; UG/1
20-100 SPRAY, METERED RESPIRATORY (INHALATION)
Qty: 3 | Refills: 1 | Status: COMPLETED | COMMUNITY
Start: 2017-07-13 | End: 2023-09-20

## 2023-09-20 RX ORDER — GLIPIZIDE 10 MG/1
10 TABLET, FILM COATED, EXTENDED RELEASE ORAL
Qty: 90 | Refills: 0 | Status: COMPLETED | COMMUNITY
Start: 2017-02-04 | End: 2023-09-20

## 2023-09-20 RX ORDER — MUCUS CLEARING DEVICE
EACH MISCELLANEOUS
Qty: 1 | Refills: 0 | Status: COMPLETED | COMMUNITY
Start: 2020-02-25 | End: 2023-09-20

## 2023-10-13 ENCOUNTER — INPATIENT (INPATIENT)
Facility: HOSPITAL | Age: 73
LOS: 2 days | Discharge: HOME | End: 2023-10-16
Attending: INTERNAL MEDICINE | Admitting: INTERNAL MEDICINE
Payer: MEDICARE

## 2023-10-13 VITALS
SYSTOLIC BLOOD PRESSURE: 145 MMHG | HEART RATE: 73 BPM | TEMPERATURE: 98 F | RESPIRATION RATE: 16 BRPM | OXYGEN SATURATION: 100 % | DIASTOLIC BLOOD PRESSURE: 77 MMHG

## 2023-10-13 DIAGNOSIS — R53.1 WEAKNESS: ICD-10-CM

## 2023-10-13 LAB
ALBUMIN SERPL ELPH-MCNC: 4.7 G/DL — SIGNIFICANT CHANGE UP (ref 3.3–5)
ALP SERPL-CCNC: 81 U/L — SIGNIFICANT CHANGE UP (ref 40–120)
ALT FLD-CCNC: 16 U/L — SIGNIFICANT CHANGE UP (ref 4–41)
ANION GAP SERPL CALC-SCNC: 13 MMOL/L — SIGNIFICANT CHANGE UP (ref 7–14)
APTT BLD: 31.5 SEC — SIGNIFICANT CHANGE UP (ref 24.5–35.6)
AST SERPL-CCNC: 20 U/L — SIGNIFICANT CHANGE UP (ref 4–40)
BASOPHILS # BLD AUTO: 0.02 K/UL — SIGNIFICANT CHANGE UP (ref 0–0.2)
BASOPHILS NFR BLD AUTO: 0.3 % — SIGNIFICANT CHANGE UP (ref 0–2)
BILIRUB SERPL-MCNC: 0.8 MG/DL — SIGNIFICANT CHANGE UP (ref 0.2–1.2)
BUN SERPL-MCNC: 13 MG/DL — SIGNIFICANT CHANGE UP (ref 7–23)
CALCIUM SERPL-MCNC: 10 MG/DL — SIGNIFICANT CHANGE UP (ref 8.4–10.5)
CHLORIDE SERPL-SCNC: 91 MMOL/L — LOW (ref 98–107)
CO2 SERPL-SCNC: 24 MMOL/L — SIGNIFICANT CHANGE UP (ref 22–31)
CREAT SERPL-MCNC: 0.69 MG/DL — SIGNIFICANT CHANGE UP (ref 0.5–1.3)
EGFR: 100 ML/MIN/1.73M2 — SIGNIFICANT CHANGE UP
EOSINOPHIL # BLD AUTO: 0.01 K/UL — SIGNIFICANT CHANGE UP (ref 0–0.5)
EOSINOPHIL NFR BLD AUTO: 0.1 % — SIGNIFICANT CHANGE UP (ref 0–6)
GLUCOSE BLDC GLUCOMTR-MCNC: 124 MG/DL — HIGH (ref 70–99)
GLUCOSE SERPL-MCNC: 123 MG/DL — HIGH (ref 70–99)
HCT VFR BLD CALC: 44.9 % — SIGNIFICANT CHANGE UP (ref 39–50)
HGB BLD-MCNC: 16 G/DL — SIGNIFICANT CHANGE UP (ref 13–17)
IANC: 5.06 K/UL — SIGNIFICANT CHANGE UP (ref 1.8–7.4)
IMM GRANULOCYTES NFR BLD AUTO: 0.6 % — SIGNIFICANT CHANGE UP (ref 0–0.9)
INR BLD: <0.9 RATIO — SIGNIFICANT CHANGE UP (ref 0.85–1.18)
LYMPHOCYTES # BLD AUTO: 0.9 K/UL — LOW (ref 1–3.3)
LYMPHOCYTES # BLD AUTO: 13.1 % — SIGNIFICANT CHANGE UP (ref 13–44)
MAGNESIUM SERPL-MCNC: 1.7 MG/DL — SIGNIFICANT CHANGE UP (ref 1.6–2.6)
MCHC RBC-ENTMCNC: 34.3 PG — HIGH (ref 27–34)
MCHC RBC-ENTMCNC: 35.6 GM/DL — SIGNIFICANT CHANGE UP (ref 32–36)
MCV RBC AUTO: 96.4 FL — SIGNIFICANT CHANGE UP (ref 80–100)
MONOCYTES # BLD AUTO: 0.82 K/UL — SIGNIFICANT CHANGE UP (ref 0–0.9)
MONOCYTES NFR BLD AUTO: 12 % — SIGNIFICANT CHANGE UP (ref 2–14)
NEUTROPHILS # BLD AUTO: 5.06 K/UL — SIGNIFICANT CHANGE UP (ref 1.8–7.4)
NEUTROPHILS NFR BLD AUTO: 73.9 % — SIGNIFICANT CHANGE UP (ref 43–77)
NRBC # BLD: 0 /100 WBCS — SIGNIFICANT CHANGE UP (ref 0–0)
NRBC # FLD: 0 K/UL — SIGNIFICANT CHANGE UP (ref 0–0)
PHOSPHATE SERPL-MCNC: 2.8 MG/DL — SIGNIFICANT CHANGE UP (ref 2.5–4.5)
PLATELET # BLD AUTO: 228 K/UL — SIGNIFICANT CHANGE UP (ref 150–400)
POTASSIUM SERPL-MCNC: 4.1 MMOL/L — SIGNIFICANT CHANGE UP (ref 3.5–5.3)
POTASSIUM SERPL-SCNC: 4.1 MMOL/L — SIGNIFICANT CHANGE UP (ref 3.5–5.3)
PROT SERPL-MCNC: 7.6 G/DL — SIGNIFICANT CHANGE UP (ref 6–8.3)
PROTHROM AB SERPL-ACNC: 10.1 SEC — SIGNIFICANT CHANGE UP (ref 9.5–13)
RBC # BLD: 4.66 M/UL — SIGNIFICANT CHANGE UP (ref 4.2–5.8)
RBC # FLD: 12.8 % — SIGNIFICANT CHANGE UP (ref 10.3–14.5)
SODIUM SERPL-SCNC: 128 MMOL/L — LOW (ref 135–145)
WBC # BLD: 6.85 K/UL — SIGNIFICANT CHANGE UP (ref 3.8–10.5)
WBC # FLD AUTO: 6.85 K/UL — SIGNIFICANT CHANGE UP (ref 3.8–10.5)

## 2023-10-13 PROCEDURE — 99285 EMERGENCY DEPT VISIT HI MDM: CPT

## 2023-10-13 PROCEDURE — 70450 CT HEAD/BRAIN W/O DYE: CPT | Mod: 26,MA

## 2023-10-13 RX ORDER — ASPIRIN/CALCIUM CARB/MAGNESIUM 324 MG
81 TABLET ORAL ONCE
Refills: 0 | Status: COMPLETED | OUTPATIENT
Start: 2023-10-13 | End: 2023-10-13

## 2023-10-13 RX ORDER — SODIUM CHLORIDE 9 MG/ML
1000 INJECTION INTRAMUSCULAR; INTRAVENOUS; SUBCUTANEOUS ONCE
Refills: 0 | Status: COMPLETED | OUTPATIENT
Start: 2023-10-13 | End: 2023-10-13

## 2023-10-13 RX ADMIN — Medication 81 MILLIGRAM(S): at 23:22

## 2023-10-13 RX ADMIN — SODIUM CHLORIDE 1000 MILLILITER(S): 9 INJECTION INTRAMUSCULAR; INTRAVENOUS; SUBCUTANEOUS at 22:14

## 2023-10-13 NOTE — CONSULT NOTE ADULT - TIME BILLING
Patient seen an examined with YUE Manning.     I agree with history as stated above with the following clarifications:   Patient with LEFT side cavernoma present since MR 2013 in his record. He also as described has a right frontal hypodensity which is present as well since 2013, developmental venous anomaly. Differential diagnosis of worsening baseline right side weakness event favors re-bleeding of known cavernoma, although associated ischemic stroke can not be completely excluded. Will await MR results for further recommendations. Avoid any antiplatelets for now. DVT prophylaxis no contraindication for pharmacological therapy at the moment. Secondary prevention measures LDL=67 and A1C =5.5. Rest of recommendations as stated above.

## 2023-10-13 NOTE — ED PROVIDER NOTE - CLINICAL SUMMARY MEDICAL DECISION MAKING FREE TEXT BOX
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  Started randomly since 1 PM yesterday. . Their symptoms and exam findings in the setting of their pmhx are concerning for TIA, stroke, metabolic abnormality .      Ordered labs, imaging, medications for diagnosis, management, and treatment.

## 2023-10-13 NOTE — CONSULT NOTE ADULT - SUBJECTIVE AND OBJECTIVE BOX
Neurology - Consult Note - 10-13-23  -  Gerald Fuchs MD  PGY-4 Neurology  Spectra: [phone] ([facility]), [phone] ([facility])  -    Name: SENTHIL SANCHEZ; 72y (1950)    Reason for Admission:     Chief Complaint:     HPI:       Review of Systems:  INCOMPLETE   CONSTITUTIONAL: No fevers or chills  EYES/ENT: No visual changes or no throat pain   NECK: No pain or stiffness  RESPIRATORY: No hemoptysis or shortness of breath  CARDIOVASCULAR: No chest pain or palpitations  GASTROINTESTINAL: No melena or hematochezia  GENITOURINARY: No dysuria or hematuria  NEUROLOGICAL: +As stated in HPI above  SKIN: No itching, burning, rashes, or lesions   All other review of systems is negative unless indicated above.    Allergies:  penicillin (Other (Mild to Mod))      PMHx:   No pertinent past medical history      PFHx:     PSuHx:   No significant past surgical history        Medications:  MEDICATIONS  (STANDING):  sodium chloride 0.9% Bolus 1000 milliLiter(s) IV Bolus once    MEDICATIONS  (PRN):      Vitals:  T(C): 36.5 (10-13-23 @ 15:55), Max: 36.5 (10-13-23 @ 15:55)  HR: 64 (10-13-23 @ 19:30) (64 - 73)  BP: 127/65 (10-13-23 @ 19:30) (127/65 - 145/77)  RR: 16 (10-13-23 @ 19:30) (16 - 16)  SpO2: 100% (10-13-23 @ 19:30) (100% - 100%)    Physical Examination:      Constitutional: well-developed, well-nourished, well-groomed  Eyes: ophthalmoscopic exam deferred secondary to COVID-19 pandemic  Cardiovascular: warm-to-touch    Neurological Examination:    - Mental Status: Eyes open, attends to examiner; oriented to person, age, month, year, location, and situation; speech is fluent with intact naming, intact repetition, and follows simple commands; some difficulty with 3-step mid-line crossing commands, requiring repeated instruction; impaired overall fund of knowledge (including current events [difficulty w/ remembering current POTUS], difficulty w/ relevant past history); able to spell WORLD backwards and recite the days of the week in reverse.    - Cranial Nerves:  II: Visual fields are full to confrontation; pupils are equal, round, and reactive to light   III, IV, VI: Extraocular movements are intact without nystagmus  V: Facial sensation is intact to pin prick in the V1-V3 distribution bilaterally  VII: R nasolabial fold flattening, otherwise face is intact  VIII: Hearing is intact to conversation  IX, X: Uvula is midline and soft palate rises symmetrically  XI: Shoulder shrug intact  XII: Tongue protrudes in the midline    - Motor/Strength Testing:  - RUE: mild drift at shoulder w/ wrist drop  - LUE: no drift  - BLE: no drifts                                   Right           Left  Deltoid                     5                 5  Biceps                      4+               5  Triceps                     5                 5  Wrist Ext (radial)       2                 5  Hand                 4                 5  Finger abduction       4-               4+    Hip Flex                   5                  5  Knee Ext	      4++              5  Dorsiflex                 4+                 5  Plantarflex               5                  5    - Right pronator drift  - Normal muscle bulk     - Reflexes:   Bicep (C5/C6):                  R 2+ --- L 2+  Brachioradialis (C5/C6) :   R 2+ --- L 2+   Patella (L3/L4) :                 R 2+ --- L 0+   Ankle (S1) :                       R 2+ --- L 2+     - Plant responses up-going on R, neutral vs downgoing on L.    - Sensory: Intact throughout to pin prick x 4 extremities.  - Coordination: Finger-nose-finger intact without ataxia or dysmetria on the L; testing limited on the R in the setting of weakness  - Gait: Ambulates with cane at baseline        Labs:                        16.0   6.85  )-----------( 228      ( 13 Oct 2023 18:00 )             44.9     10-13    128<L>  |  91<L>  |  13  ----------------------------<  123<H>  4.1   |  24  |  0.69    Ca    10.0      13 Oct 2023 18:00  Phos  2.8     10-13  Mg     1.70     10-13    TPro  7.6  /  Alb  4.7  /  TBili  0.8  /  DBili  x   /  AST  20  /  ALT  16  /  AlkPhos  81  10-13    CAPILLARY BLOOD GLUCOSE      POCT Blood Glucose.: 144 mg/dL (13 Oct 2023 15:56)    LIVER FUNCTIONS - ( 13 Oct 2023 18:00 )  Alb: 4.7 g/dL / Pro: 7.6 g/dL / ALK PHOS: 81 U/L / ALT: 16 U/L / AST: 20 U/L / GGT: x             PT/INR - ( 13 Oct 2023 18:00 )   PT: 10.1 sec;   INR: <0.90 ratio         PTT - ( 13 Oct 2023 18:00 )  PTT:31.5 sec  CSF:                  Radiology:  CT Head No Cont:  (13 Oct 2023 19:28)     Neurology - Consult Note - 10-13-23  -  Gerald Fuchs MD  PGY-4 Neurology  Spectra: [phone] ([facility]), [phone] ([facility])  -    Name: SENTHIL SANCHEZ; 72y (1950)    Reason for Admission:     Chief Complaint:     HPI: 72-year-old right-handed male w/ PMHx HTN, HLD (atorvastatin 40 mg QD), DM, stroke (10 years ago, residual R hemiparesis, ambulates w/ cane, not on antithrombotics), cavernoma, COPD/emphysema, active smoker (55 pack year), chronic memory issues, who presents to [facility] ED due to new +/- worsening of residual right sided weakness. Patient is a poor historian. The majority of patient's past medical history as well as history of present illness is provided by his wife at the bedside in the ED. Patient was at home yesterday in his usual state of health when he had acute onset of the symptoms above at around 13:00 PM 10/12/23. Per patient's wife, his face appears the same. Patient's wife states that he has difficulty with writing with his right hand at baseline s/p chronic stroke, but now it is "much more pronounced." Patient ambulates with cane at baseline, but it is unclear whether he has had worsening of his RLE weakness. Per wife, patient's son said that patient seemed unsteady while using his walker. Patient denies changes in speech, vision, hearing, swallowing, voice quality, or balance/room-spinning sensations. (See review of systems below for other pertinent negatives.)    Regarding stroke history, patient's wife states that they were told that maybe his stroke was secondary to blood from his cavernoma. Of note, patient's chronic stroke symptoms are on the right, and his cavernoma on our CT head is on the right. He was not prescribed and is not taking any antithrombotics for secondary stroke prevention. Patient went to see vascular neurology about 10 years ago for a follow-up appointment, at which time it was decided that there was no further management indicated. Patient has not seen a neurologist since that time.      Review of Systems:     CONSTITUTIONAL: No fevers or chills  EYES/ENT: No visual changes or no throat pain   NECK: No pain or stiffness  RESPIRATORY: No hemoptysis or new shortness of breath or new cough  CARDIOVASCULAR: No chest pain   GASTROINTESTINAL: No melena or hematochezia  GENITOURINARY: No dysuria or hematuria  NEUROLOGICAL: +As stated in HPI above  SKIN: No itching, burning, rashes, or lesions   All other review of systems is negative unless indicated above.    Allergies:  penicillin (Other (Mild to Mod))      PMHx:   No pertinent past medical history      PFHx:     PSuHx:   No significant past surgical history        Medications:  MEDICATIONS  (STANDING):  sodium chloride 0.9% Bolus 1000 milliLiter(s) IV Bolus once    MEDICATIONS  (PRN):      Vitals:  T(C): 36.5 (10-13-23 @ 15:55), Max: 36.5 (10-13-23 @ 15:55)  HR: 64 (10-13-23 @ 19:30) (64 - 73)  BP: 127/65 (10-13-23 @ 19:30) (127/65 - 145/77)  RR: 16 (10-13-23 @ 19:30) (16 - 16)  SpO2: 100% (10-13-23 @ 19:30) (100% - 100%)    Physical Examination:      Constitutional: well-developed, well-nourished, well-groomed  Eyes: ophthalmoscopic exam deferred secondary to COVID-19 pandemic  Cardiovascular: warm-to-touch    Neurological Examination:    - Mental Status: Eyes open, attends to examiner; oriented to person, age, month, year, location, and situation; speech is fluent with intact naming, intact repetition, and follows simple commands; some difficulty with 3-step mid-line crossing commands, requiring repeated instruction; impaired overall fund of knowledge (including current events [difficulty w/ remembering current POTUS], difficulty w/ relevant past history); able to spell WORLD backwards and recite the days of the week in reverse.    - Cranial Nerves:  II: Visual fields are full to confrontation; pupils are equal, round, and reactive to light   III, IV, VI: Extraocular movements are intact without nystagmus  V: Facial sensation is intact to pin prick in the V1-V3 distribution bilaterally  VII: R nasolabial fold flattening, otherwise face is intact  VIII: Hearing is intact to conversation  IX, X: Uvula is midline and soft palate rises symmetrically  XI: Shoulder shrug intact  XII: Tongue protrudes in the midline    - Motor/Strength Testing:  - RUE: mild drift at shoulder w/ wrist drop  - LUE: no drift  - BLE: no drifts                                   Right           Left  Deltoid                     5                 5  Biceps                      4+               5  Triceps                     5                 5  Wrist Ext (radial)       2                 5  Hand                 4                 5  Finger abduction       4-               4+    Hip Flex                   5                  5  Knee Ext	      4++              5  Dorsiflex                 4+                 5  Plantarflex               5                  5    - Right pronator drift  - Normal muscle bulk     - Reflexes:   Bicep (C5/C6):                  R 2+ --- L 2+  Brachioradialis (C5/C6) :   R 2+ --- L 2+   Patella (L3/L4) :                 R 2+ --- L 0+   Ankle (S1) :                       R 2+ --- L 2+     - Plant responses up-going on R, neutral vs downgoing on L.    - Sensory: Intact throughout to pin prick x 4 extremities.  - Coordination: Finger-nose-finger intact without ataxia or dysmetria on the L; testing limited on the R in the setting of weakness  - Gait: Ambulates with cane at baseline        Labs:                        16.0   6.85  )-----------( 228      ( 13 Oct 2023 18:00 )             44.9     10-13    128<L>  |  91<L>  |  13  ----------------------------<  123<H>  4.1   |  24  |  0.69    Ca    10.0      13 Oct 2023 18:00  Phos  2.8     10-13  Mg     1.70     10-13    TPro  7.6  /  Alb  4.7  /  TBili  0.8  /  DBili  x   /  AST  20  /  ALT  16  /  AlkPhos  81  10-13    CAPILLARY BLOOD GLUCOSE      POCT Blood Glucose.: 144 mg/dL (13 Oct 2023 15:56)    LIVER FUNCTIONS - ( 13 Oct 2023 18:00 )  Alb: 4.7 g/dL / Pro: 7.6 g/dL / ALK PHOS: 81 U/L / ALT: 16 U/L / AST: 20 U/L / GGT: x         PT/INR - ( 13 Oct 2023 18:00 )   PT: 10.1 sec;   INR: <0.90 ratio    PTT - ( 13 Oct 2023 18:00 )  PTT:31.5 sec    Radiology:  CT Head No Cont:  (13 Oct 2023 19:28)    FINDINGS:  There is a subtle hyperdense focus in the right frontal subcortical white   matter which has a linear component extending caudally along the   periventricular white matter which is best seen on series 2 images 19   through 23 and coronal series 601 image 36. No definite acute   intracranial hemorrhage, vasogenic edema or or evidence for acute large   vascular territory infarct. Chronic infarct in the left corona radiata   extending into the left internal capsule. No abnormal extra-axial   collections. The basal cisterns are patent without evidence of central   herniation.    Mild cerebral volume loss with proportional prominence of the sulci and   ventricles. Moderate patchy periventricular white matter hypoattenuation,   likely the sequela of chronic microvascular changes.    The calvarium is intact. The soft tissues of the scalp are unremarkable.   The visualized paranasal sinuses are clear. The mastoid air cells and   middle ear cavities are clear. The orbits are intact.    IMPRESSION:  Subtle hyperdense focus in the left frontal subcortical white matter   which has a linear hyperdense component associated with it possibly a DVA   with cavernoma. Follow-up with contrast-enhanced brain MRI is recommended   for further characterization. No definite acuteintracranial hemorrhage   vasogenic edema or midline shift. Moderate chronic microvascular changes   and chronic left corona radiata lacunar infarct.    < end of copied text >   Neurology - Consult Note - 10-13-23  -  Gerald Fuchs MD  PGY-4 Neurology  Spectra: [phone] ([facility]), [phone] ([facility])  -    Name: SENTHIL SANCHEZ; 72y (1950)    Reason for Admission:     Chief Complaint:     HPI: 72-year-old right-handed male w/ PMHx HTN, HLD (atorvastatin 40 mg QD), DM, stroke (10 years ago, residual R hemiparesis, ambulates w/ cane, not on antithrombotics), cavernoma, COPD/emphysema, active smoker (55 pack year), chronic memory issues, who presents to [facility] ED due to new +/- worsening of residual right sided weakness. Patient is a poor historian. The majority of patient's past medical history as well as history of present illness is provided by his wife at the bedside in the ED. Patient was at home yesterday in his usual state of health when he had acute onset of the symptoms above at around 13:00 PM 10/12/23. Per patient's wife, his face appears the same. Patient's wife states that he has difficulty with writing with his right hand at baseline s/p chronic stroke, but now it is "much more pronounced." Patient's wife endorses that his right wrist has never been this weak, but she is not confident. Patient ambulates with cane at baseline, but it is unclear whether he has had worsening of his RLE weakness. Per wife, patient's son said that patient seemed unsteady while using his walker. Patient denies changes in speech, vision, hearing, swallowing, voice quality, or balance/room-spinning sensations. (See review of systems below for other pertinent negatives.)    Regarding stroke history, patient's wife states that they were told that maybe his stroke was secondary to blood from his cavernoma. Of note, patient's chronic stroke symptoms are on the right, and his cavernoma on our CT head is on the right. He was not prescribed and is not taking any antithrombotics for secondary stroke prevention. Patient went to see vascular neurology about 10 years ago for a follow-up appointment, at which time it was decided that there was no further management indicated. Patient has not seen a neurologist since that time.      Review of Systems:     CONSTITUTIONAL: No fevers or chills  EYES/ENT: No visual changes or no throat pain   NECK: No pain or stiffness  RESPIRATORY: No hemoptysis or new shortness of breath or new cough  CARDIOVASCULAR: No chest pain   GASTROINTESTINAL: No melena or hematochezia  GENITOURINARY: No dysuria or hematuria  NEUROLOGICAL: +As stated in HPI above  SKIN: No itching, burning, rashes, or lesions   All other review of systems is negative unless indicated above.    Allergies:  penicillin (Other (Mild to Mod))      PMHx:   No pertinent past medical history      PFHx:     PSuHx:   No significant past surgical history        Medications:  MEDICATIONS  (STANDING):  sodium chloride 0.9% Bolus 1000 milliLiter(s) IV Bolus once    MEDICATIONS  (PRN):      Vitals:  T(C): 36.5 (10-13-23 @ 15:55), Max: 36.5 (10-13-23 @ 15:55)  HR: 64 (10-13-23 @ 19:30) (64 - 73)  BP: 127/65 (10-13-23 @ 19:30) (127/65 - 145/77)  RR: 16 (10-13-23 @ 19:30) (16 - 16)  SpO2: 100% (10-13-23 @ 19:30) (100% - 100%)    Physical Examination:      Constitutional: well-developed, well-nourished, well-groomed  Eyes: ophthalmoscopic exam deferred secondary to COVID-19 pandemic  Cardiovascular: warm-to-touch    Neurological Examination:    - Mental Status: Eyes open, attends to examiner; oriented to person, age, month, year, location, and situation; speech is fluent with intact naming, intact repetition, and follows simple commands; some difficulty with 3-step mid-line crossing commands, requiring repeated instruction; impaired overall fund of knowledge (including current events [difficulty w/ remembering current POTUS], difficulty w/ relevant past history); able to spell WORLD backwards and recite the days of the week in reverse.    - Cranial Nerves:  II: Visual fields are full to confrontation; pupils are equal, round, and reactive to light   III, IV, VI: Extraocular movements are intact without nystagmus  V: Facial sensation is intact to pin prick in the V1-V3 distribution bilaterally  VII: R nasolabial fold flattening, otherwise face is intact  VIII: Hearing is intact to conversation  IX, X: Uvula is midline and soft palate rises symmetrically  XI: Shoulder shrug intact  XII: Tongue protrudes in the midline    - Motor/Strength Testing:  - RUE: mild drift at shoulder w/ wrist drop  - LUE: no drift  - BLE: no drifts                                   Right           Left  Deltoid                     5                 5  Biceps                      4+               5  Triceps                     5                 5  Wrist Ext (radial)       2                 5  Hand                 4                 5  Finger abduction       4-               4+    Hip Flex                   5                  5  Knee Ext	      4++              5  Dorsiflex                 4+                 5  Plantarflex               5                  5    - Right pronator drift  - Normal muscle bulk     - Reflexes:   Bicep (C5/C6):                  R 2+ --- L 2+  Brachioradialis (C5/C6) :   R 2+ --- L 2+   Patella (L3/L4) :                 R 2+ --- L 0+   Ankle (S1) :                       R 2+ --- L 2+     - Plant responses up-going on R, neutral vs downgoing on L.    - Sensory: Intact throughout to pin prick x 4 extremities.  - Coordination: Finger-nose-finger intact without ataxia or dysmetria on the L; testing limited on the R in the setting of weakness  - Gait: Ambulates with cane at baseline        Labs:                        16.0   6.85  )-----------( 228      ( 13 Oct 2023 18:00 )             44.9     10-13    128<L>  |  91<L>  |  13  ----------------------------<  123<H>  4.1   |  24  |  0.69    Ca    10.0      13 Oct 2023 18:00  Phos  2.8     10-13  Mg     1.70     10-13    TPro  7.6  /  Alb  4.7  /  TBili  0.8  /  DBili  x   /  AST  20  /  ALT  16  /  AlkPhos  81  10-13    CAPILLARY BLOOD GLUCOSE      POCT Blood Glucose.: 144 mg/dL (13 Oct 2023 15:56)    LIVER FUNCTIONS - ( 13 Oct 2023 18:00 )  Alb: 4.7 g/dL / Pro: 7.6 g/dL / ALK PHOS: 81 U/L / ALT: 16 U/L / AST: 20 U/L / GGT: x         PT/INR - ( 13 Oct 2023 18:00 )   PT: 10.1 sec;   INR: <0.90 ratio    PTT - ( 13 Oct 2023 18:00 )  PTT:31.5 sec    Radiology:  CT Head No Cont:  (13 Oct 2023 19:28)    FINDINGS:  There is a subtle hyperdense focus in the right frontal subcortical white   matter which has a linear component extending caudally along the   periventricular white matter which is best seen on series 2 images 19   through 23 and coronal series 601 image 36. No definite acute   intracranial hemorrhage, vasogenic edema or or evidence for acute large   vascular territory infarct. Chronic infarct in the left corona radiata   extending into the left internal capsule. No abnormal extra-axial   collections. The basal cisterns are patent without evidence of central   herniation.    Mild cerebral volume loss with proportional prominence of the sulci and   ventricles. Moderate patchy periventricular white matter hypoattenuation,   likely the sequela of chronic microvascular changes.    The calvarium is intact. The soft tissues of the scalp are unremarkable.   The visualized paranasal sinuses are clear. The mastoid air cells and   middle ear cavities are clear. The orbits are intact.    IMPRESSION:  Subtle hyperdense focus in the left frontal subcortical white matter   which has a linear hyperdense component associated with it possibly a DVA   with cavernoma. Follow-up with contrast-enhanced brain MRI is recommended   for further characterization. No definite acuteintracranial hemorrhage   vasogenic edema or midline shift. Moderate chronic microvascular changes   and chronic left corona radiata lacunar infarct.    < end of copied text >   Neurology - Consult Note - 10-13-23  -  Gerald Fuchs MD  PGY-4 Neurology  Spectra: [phone] ([facility]), [phone] ([facility])  -    Name: SENTHIL SANCHEZ; 72y (1950)    Reason for Admission:     Chief Complaint:     HPI: 72-year-old right-handed male w/ PMHx HTN, HLD (atorvastatin 40 mg QD), DM, stroke (10 years ago, residual R hemiparesis, ambulates w/ cane, not on antithrombotics), cavernoma, COPD/emphysema, active smoker (55 pack year), chronic memory issues, who presents to [facility] ED due to new +/- worsening of residual right sided weakness. Patient is a poor historian. The majority of patient's past medical history as well as history of present illness is provided by his wife at the bedside in the ED. Patient was at home yesterday in his usual state of health when he had acute onset of the symptoms above at around 13:00 PM 10/12/23. Per patient's wife, his face appears the same. Patient's wife states that he has difficulty with writing with his right hand at baseline s/p chronic stroke, but now it is "much more pronounced." Patient's wife endorses that his right wrist has never been this weak, but she is not confident. Patient ambulates with cane at baseline, but it is unclear whether he has had worsening of his RLE weakness. Per wife, patient's son said that patient seemed unsteady while using his walker. Patient denies changes in speech, vision, hearing, swallowing, voice quality, or balance/room-spinning sensations. (See review of systems below for other pertinent negatives.)    Regarding stroke history, patient's wife states that they were told that maybe his stroke was secondary to blood from his cavernoma. Of note, patient's chronic stroke symptoms are on the right, and his cavernoma on our CT head is on the right. He was not prescribed and is not taking any antithrombotics for secondary stroke prevention. Patient went to see vascular neurology about 10 years ago for a follow-up appointment, at which time it was decided that there was no further management indicated. Patient has not seen a neurologist since that time.      Review of Systems:     CONSTITUTIONAL: No fevers or chills  EYES/ENT: No visual changes or no throat pain   NECK: No pain or stiffness  RESPIRATORY: No hemoptysis or new shortness of breath or new cough  CARDIOVASCULAR: No chest pain   GASTROINTESTINAL: No melena or hematochezia  GENITOURINARY: No dysuria or hematuria  NEUROLOGICAL: +As stated in HPI above  SKIN: No itching, burning, rashes, or lesions   All other review of systems is negative unless indicated above.    Allergies:  penicillin (Other (Mild to Mod))      PMHx:   No pertinent past medical history      PFHx:     PSuHx:   No significant past surgical history        Medications:  MEDICATIONS  (STANDING):  sodium chloride 0.9% Bolus 1000 milliLiter(s) IV Bolus once    MEDICATIONS  (PRN):      Vitals:  T(C): 36.5 (10-13-23 @ 15:55), Max: 36.5 (10-13-23 @ 15:55)  HR: 64 (10-13-23 @ 19:30) (64 - 73)  BP: 127/65 (10-13-23 @ 19:30) (127/65 - 145/77)  RR: 16 (10-13-23 @ 19:30) (16 - 16)  SpO2: 100% (10-13-23 @ 19:30) (100% - 100%)    Physical Examination:      Constitutional: well-developed, well-nourished, well-groomed  Eyes: ophthalmoscopic exam deferred secondary to COVID-19 pandemic  Cardiovascular: warm-to-touch    Neurological Examination:    - Mental Status: Eyes open, attends to examiner; oriented to person, age, month, year, location, and situation; speech is fluent with intact naming, intact repetition, and follows simple commands; some difficulty with 3-step mid-line crossing commands, requiring repeated instruction; impaired overall fund of knowledge (including current events [difficulty w/ remembering current POTUS], difficulty w/ relevant past history); able to spell WORLD backwards and recite the days of the week in reverse.    - Cranial Nerves:  II: Visual fields are full to confrontation; pupils are equal, round, and reactive to light   III, IV, VI: Extraocular movements are intact without nystagmus  V: Facial sensation is intact to pin prick in the V1-V3 distribution bilaterally  VII: R nasolabial fold flattening, otherwise face is intact  VIII: Hearing is intact to conversation  IX, X: Uvula is midline and soft palate rises symmetrically  XI: Shoulder shrug intact  XII: Tongue protrudes in the midline    - Motor/Strength Testing:  - RUE: mild drift at shoulder w/ wrist drop  - LUE: no drift  - BLE: no drifts                                   Right           Left  Deltoid                     5                 5  Biceps                      4+               5  Triceps                     5                 5  Wrist Ext (radial)       2                 5  Hand                 4                 5  Finger abduction       4-               4+    Hip Flex                   5                  5  Knee Ext	      4++              5  Dorsiflex                 4+                 5  Plantarflex               5                  5    - Right pronator drift  - Normal muscle bulk     - Reflexes:   Bicep (C5/C6):                  R 2+ --- L 2+  Brachioradialis (C5/C6) :   R 2+ --- L 2+   Patella (L3/L4) :                 R 2+ --- L 0+   Ankle (S1) :                       R 2+ --- L 2+     - Plant responses up-going on R, neutral vs downgoing on L.    - Sensory: Intact throughout to pin prick x 4 extremities.  - Coordination: Finger-nose-finger intact without ataxia or dysmetria on the L; testing limited on the R in the setting of weakness  - Gait: Ambulates with cane at baseline        Labs:                        16.0   6.85  )-----------( 228      ( 13 Oct 2023 18:00 )             44.9     10-13    128<L>  |  91<L>  |  13  ----------------------------<  123<H>  4.1   |  24  |  0.69    Ca    10.0      13 Oct 2023 18:00  Phos  2.8     10-13  Mg     1.70     10-13    TPro  7.6  /  Alb  4.7  /  TBili  0.8  /  DBili  x   /  AST  20  /  ALT  16  /  AlkPhos  81  10-13    CAPILLARY BLOOD GLUCOSE      POCT Blood Glucose.: 144 mg/dL (13 Oct 2023 15:56)    LIVER FUNCTIONS - ( 13 Oct 2023 18:00 )  Alb: 4.7 g/dL / Pro: 7.6 g/dL / ALK PHOS: 81 U/L / ALT: 16 U/L / AST: 20 U/L / GGT: x         PT/INR - ( 13 Oct 2023 18:00 )   PT: 10.1 sec;   INR: <0.90 ratio    PTT - ( 13 Oct 2023 18:00 )  PTT:31.5 sec    Radiology:  CT Head No Cont:  (13 Oct 2023 19:28)    FINDINGS:  There is a subtle hyperdense focus in the right frontal subcortical white   matter which has a linear component extending caudally along the   periventricular white matter which is best seen on series 2 images 19   through 23 and coronal series 601 image 36. No definite acute   intracranial hemorrhage, vasogenic edema or or evidence for acute large   vascular territory infarct. Chronic infarct in the left corona radiata   extending into the left internal capsule. No abnormal extra-axial   collections. The basal cisterns are patent without evidence of central   herniation.    Mild cerebral volume loss with proportional prominence of the sulci and   ventricles. Moderate patchy periventricular white matter hypoattenuation,   likely the sequela of chronic microvascular changes.    The calvarium is intact. The soft tissues of the scalp are unremarkable.   The visualized paranasal sinuses are clear. The mastoid air cells and   middle ear cavities are clear. The orbits are intact.    IMPRESSION:  Subtle hyperdense focus in the left frontal subcortical white matter   which has a linear hyperdense component associated with it possibly a DVA   with cavernoma. Follow-up with contrast-enhanced brain MRI is recommended   for further characterization. No definite acute intracranial hemorrhage   vasogenic edema or midline shift. Moderate chronic microvascular changes   and chronic left corona radiata lacunar infarct.    < end of copied text >

## 2023-10-13 NOTE — ED ADULT NURSE NOTE - OBJECTIVE STATEMENT
Pt A+Ox4.  Pt c/o numbness and difficulty moving right arm starting yesterday at 12pm.  Pt states he has a hx of stroke on the right side but yesterday he began to have worsening symptoms.    Pt also reports worsening numbness to right leg.  Pt lungs clear, abdomen soft, skin intact.  IV placed left AC#20.  Fall precautions maintained.  Son at bedside.

## 2023-10-13 NOTE — ED ADULT TRIAGE NOTE - CHIEF COMPLAINT QUOTE
pt woke up at 12pm yesterday with c/o numbness to right arm and right leg. 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 Pt states he did have residual numbness to right sided from CVA but it was increased than baseline. PHx DM2, HTN , HLD. Fingerstick 144

## 2023-10-13 NOTE — ED ADULT NURSE NOTE - NSFALLRISKINTERV_ED_ALL_ED

## 2023-10-13 NOTE — ED PROVIDER NOTE - PHYSICAL EXAMINATION
General: non-toxic, NAD  HEENT: NCAT, PERRL  Cardiac: RRR, no murmurs, 2+ peripheral pulses  Chest: CTA  Abdomen: soft, non-distended, bowel sounds present, no ttp, no rebound or guarding  Extremities: no peripheral edema, calf tenderness, or leg size discrepancies  Skin: no rashes  Neuro: 5+motor LUE and 4+ RLE and RUE, Sensation decreased RUE and RLE, altered gait due to RLE weakness, AAOx4  Psych: mood and affect appropriate

## 2023-10-13 NOTE — CONSULT NOTE ADULT - ASSESSMENT
INCOMPLETE    Assessment: ***. Initial VS in ED: ***. Exam: ***.      mRS: ***  LKN: 13:00 PM 10/12/23  NIHSS: ***    Not a tenecteplase or mechanical thrombectomy candidate due to presenting outside of acute treatment window.    Impression: ***    Recommendations:    Disposition: CDU vs inpatient admission    Diagnostics:  [] MRI brain without contrast   [] MRA brain without contrast; MRA neck with contrast   [] TTE w/ bubble   [] Implantable loop recorder  [] Lipid panel, HbA1c  [] CBC, CMP, coagulation panel, troponin    Medications:  [] ASA 81 mg PO (325 per rectum if fails dysphagia)   [] Atorvastatin 80mg (titrate to LDL < 70)   [] Lovenox SQ for DVT prophylaxis     Miscellaneous:  [] NPO unless passes dysphagia screen; swallow eval if fails  [] Keep BP permissive up to 220/110 for 48 hours followed by gradual normotension over 2-3 days   [] Telemonitoring  [] Neurological checks and vital signs per unit protocol  [] BGM goals 140-180  [] PT/OT; S/S evaluation    * Case and plan not final until Attending attestation * Assessment: 72-year-old right-handed male w/ PMHx HTN, HLD DM, stroke (10 years ago, residual R hemiparesis, ambulates w/ cane, not on antithrombotics), cavernoma (R frontal), COPD/emphysema, active smoker (55 pack year), chronic memory issues, p/w new +/- worsening of residual right sided weakness. Patient is a poor historian. Per wife, right face is at baseline; distal RUE is profoundly weaker than baseline; RLE is possibly weaker, but not clear. Per wife, stroke was thought to be secondary to bleed from cavernoma. However, worth noting that cavernoma on our CT head is on the R, and patient's symptoms are on the R. Otherwise negative general and neurological review of system. VS in ED: /65, HR 64, afebrile. Exam: R NLF, RUE paresis (distal >> proximal), RLE paresis (distal > proximal), clear Babinski on R. Pertinent labs: sodium 128, glucose 123, CBC wnl, coags wnl.    mRS: ***  LKN: 13:00 PM 10/12/23  NIHSS: 2 (R NLF, mild RUE drift)    Not a tenecteplase or mechanical thrombectomy candidate due to presenting outside of acute treatment window.    Impression: Worsening of chronic right hemiparesis (versus new symptom of profound distal RUE paresis) localizable to left brain dysfunction; possibly due to either new acute ischemic stroke vs recrudescence of prior stroke symptoms. If recrudescence, would consider metabolic derangement as provoking factor (hyponatremia?) vs infectious etiologies.     Recommendations:    Disposition: CDU vs inpatient admission    Diagnostics:  [] Hyponatremia workup, as per emergency department  [] Infectious workup, including chest XR and urinalysis  [] MRI brain w/wo contrast - agree with radiology recommendation  [] MRA brain without contrast; MRA neck with contrast - or - CT angio of head and neck w/ contrast  [] TTE   [] Implantable loop recorder - can be considered in outpatient setting  [] Lipid panel, HbA1c  [] CBC, CMP, coagulation panel, troponin    Medications:  [] ASA 81 mg PO (325 per rectum if fails dysphagia)   [] Atorvastatin 80mg (titrate to LDL < 70)   [] Lovenox SQ for DVT prophylaxis     Miscellaneous:  [] NPO unless passes dysphagia screen; swallow eval if fails  [] Keep BP permissive up to 220/110 for 24 hours followed by gradual normotension over 1-2 days  [] Telemonitoring  [] Neurological checks and vital signs per unit protocol  [] BGM goals 140-180  [] PT/OT; S/S evaluation    * Case and plan not final until Attending attestation * Assessment: 72-year-old right-handed male w/ PMHx HTN, HLD DM, stroke (10 years ago, residual R hemiparesis, ambulates w/ cane, not on antithrombotics), cavernoma (R frontal), COPD/emphysema, active smoker (55 pack year), chronic memory issues, p/w new +/- worsening of residual right sided weakness. Patient is a poor historian. Per wife, right face is at baseline; distal RUE is profoundly weaker than baseline; RLE is possibly weaker, but not clear. Per wife, stroke was thought to be secondary to bleed from cavernoma. However, worth noting that cavernoma on our CT head is on the R, and patient's symptoms are on the R. Otherwise negative general and neurological review of system. VS in ED: /65, HR 64, afebrile. Exam: R NLF, RUE paresis (distal >> proximal), RLE paresis (distal > proximal), clear Babinski on R. Pertinent labs: sodium 128, glucose 123, CBC wnl, coags wnl. CT head: subtle hyperdensity in R frontal subcortical along periventricular WM, chronic L corona radiata lacunar infarct.    mRS: ***  LKN: 13:00 PM 10/12/23  NIHSS: 2 (R NLF, mild RUE drift)    Not a tenecteplase or mechanical thrombectomy candidate due to presenting outside of acute treatment window.    Impression: Worsening of chronic right hemiparesis (versus new symptom of profound distal RUE paresis) localizable to left brain dysfunction; possibly due to either new acute ischemic stroke vs recrudescence of prior stroke symptoms (CT head w/ chronic L corona radiata lacunar infarct). If recrudescence, would consider metabolic derangement as provoking factor (hyponatremia?) vs infectious etiologies.     Recommendations:    Disposition: CDU vs inpatient admission    Diagnostics:  [] Hyponatremia workup, as per emergency department  [] Infectious workup, including chest XR and urinalysis  [] MRI brain w/wo contrast - agree with radiology recommendation  [] MRA brain without contrast; MRA neck with contrast - or - CT angio of head and neck w/ contrast  [] TTE   [] Implantable loop recorder - can be considered in outpatient setting  [] Lipid panel, HbA1c  [] CBC, CMP, coagulation panel, troponin    Medications:  [] ASA 81 mg PO (325 per rectum if fails dysphagia)   [] Atorvastatin 80mg (titrate to LDL < 70)   [] Lovenox SQ for DVT prophylaxis     Miscellaneous:  [] NPO unless passes dysphagia screen; swallow eval if fails  [] Keep BP permissive up to 220/110 for 24 hours followed by gradual normotension over 1-2 days  [] Telemonitoring  [] Neurological checks and vital signs per unit protocol  [] BGM goals 140-180  [] PT/OT; S/S evaluation    * Case and plan not final until Attending attestation * Assessment: 72-year-old right-handed male w/ PMHx HTN, HLD DM, stroke (10 years ago, residual R hemiparesis, ambulates w/ cane, not on antithrombotics), cavernoma (R frontal), COPD/emphysema, active smoker (55 pack year), chronic memory issues, p/w new +/- worsening of residual right sided weakness. Patient is a poor historian. Per wife, right face is at baseline; distal RUE is profoundly weaker than baseline; RLE is possibly weaker, but not clear. Per wife, stroke was thought to be secondary to bleed from cavernoma. However, worth noting that cavernoma on our CT head is on the R, and patient's symptoms are on the R. Otherwise negative general and neurological review of system. VS in ED: /65, HR 64, afebrile. Exam: R NLF, RUE paresis (distal >> proximal), RLE paresis (distal > proximal), clear Babinski on R. Pertinent labs: sodium 128, glucose 123, CBC wnl, coags wnl. CT head: subtle hyperdensity in R frontal subcortical along periventricular WM, chronic L corona radiata lacunar infarct.    mRS: ***  LKN: 13:00 PM 10/12/23  NIHSS: 2 (R NLF, mild RUE drift)    Not a tenecteplase or mechanical thrombectomy candidate due to presenting outside of acute treatment window.    Impression: Worsening of chronic right hemiparesis (versus new symptom of profound distal RUE paresis) localizable to left brain dysfunction; possibly due to either new acute ischemic stroke vs recrudescence of prior stroke symptoms (CT head w/ chronic L corona radiata lacunar infarct). If recrudescence, would consider metabolic derangement as provoking factor (hyponatremia?) vs infectious etiologies.     Recommendations:    Disposition: CDU vs inpatient admission    [] Follow-up amended CT head read from radiology - regarding left vs right location of subtle hyperdensity (appears R to my eye)    Diagnostics:  [] Hyponatremia workup, as per emergency department  [] Infectious workup, including chest XR and urinalysis  [] MRI brain w/wo contrast - agree with radiology recommendation  [] MRA brain without contrast; MRA neck with contrast - or - CT angio of head and neck w/ contrast  [] TTE   [] Implantable loop recorder - can be considered in outpatient setting  [] Lipid panel, HbA1c  [] CBC, CMP, coagulation panel, troponin    Medications:  [] ASA 81 mg PO (325 per rectum if fails dysphagia)   [] Atorvastatin 80mg (titrate to LDL < 70)   [] Lovenox SQ for DVT prophylaxis     Miscellaneous:  [] NPO unless passes dysphagia screen; swallow eval if fails  [] Keep BP permissive up to 220/110 for 24 hours followed by gradual normotension over 1-2 days  [] Telemonitoring  [] Neurological checks and vital signs per unit protocol  [] BGM goals 140-180  [] PT/OT; S/S evaluation    * Case and plan not final until Attending attestation * Assessment: 72-year-old right-handed male w/ PMHx HTN, HLD DM, stroke (10 years ago, residual R hemiparesis, ambulates w/ cane, not on antithrombotics), cavernoma (R frontal), COPD/emphysema, active smoker (55 pack year), chronic memory issues, p/w new +/- worsening of residual right sided weakness. Patient is a poor historian. Per wife, right face is at baseline; distal RUE is profoundly weaker than baseline; RLE is possibly weaker, but not clear. Per wife, stroke was thought to be secondary to bleed from cavernoma. However, worth noting that cavernoma on our CT head is on the R, and patient's symptoms are on the R. Otherwise negative general and neurological review of system. VS in ED: /65, HR 64, afebrile. Exam: R NLF, RUE paresis (distal >> proximal), RLE paresis (distal > proximal), clear Babinski on R. Pertinent labs: sodium 128, glucose 123, CBC wnl, coags wnl. CT head: subtle hyperdensity in R frontal subcortical along periventricular WM, chronic L corona radiata lacunar infarct.    mRS: 1  LKN: 13:00 PM 10/12/23  NIHSS: 2 (R NLF, mild RUE drift)    Not a tenecteplase or mechanical thrombectomy candidate due to presenting outside of acute treatment window.    Impression: Worsening of chronic right hemiparesis (versus new symptom of profound distal RUE paresis) localizable to left brain dysfunction; possibly due to either new acute ischemic stroke vs recrudescence of prior stroke symptoms (CT head w/ chronic L corona radiata lacunar infarct). If recrudescence, would consider metabolic derangement as provoking factor (hyponatremia?) vs infectious etiologies.     Recommendations:    Disposition: CDU vs inpatient admission    [] Follow-up amended CT head read from radiology - regarding left vs right location of subtle hyperdensity (appears R to my eye)    Diagnostics:  [] Hyponatremia workup, as per emergency department  [] Infectious workup, including chest XR and urinalysis  [] MRI brain w/wo contrast - agree with radiology recommendation  [] MRA brain without contrast; MRA neck with contrast - or - CT angio of head and neck w/ contrast  [] TTE   [] Implantable loop recorder - can be considered in outpatient setting  [] Lipid panel, HbA1c  [] CBC, CMP, coagulation panel, troponin    Medications:  [] ASA 81 mg PO (325 per rectum if fails dysphagia)   [] Atorvastatin 80mg (titrate to LDL < 70)   [] Lovenox SQ for DVT prophylaxis     Miscellaneous:  [] NPO unless passes dysphagia screen; swallow eval if fails  [] Keep BP permissive up to 220/110 for 24 hours followed by gradual normotension over 1-2 days  [] Telemonitoring  [] Neurological checks and vital signs per unit protocol  [] BGM goals 140-180  [] PT/OT; S/S evaluation    * Case and plan not final until Attending attestation *

## 2023-10-13 NOTE — ED PROVIDER NOTE - OBJECTIVE STATEMENT
72-year-old male pmhx of hypertension, hyperlipidemia, diabetes type 2, cavernoma, CVA 2013 with chronic right-sided deficits comes to ED w/ right arm and leg weakness worse than baseline. Started randomly since 1 PM yesterday. Their pain/symptom is moderate, started 1 days ago, located in R arm and R leg, constant, non-mediating with rest. Started randomly. Denies trauma, falls, fever, headache, AMS, dizziness, syncope, shortness of breath, cough, rhinorrhea, congestion, chest pain, palpitations, abdominal pain, nausea, vomiting, diarrhea, constipation, melena, hematochezia, dysuria, hematuria, urinary frequency, flank pain, skin changes, p.o. issues, issues urinating, issues stooling, back pain.    PCP: Dr. Chris Ruiz, PCP  Specialist: Dr. Lamont Barone, Cardiology  Pharmacy: [facility] [id number]

## 2023-10-13 NOTE — ED PROVIDER NOTE - PROGRESS NOTE DETAILS
Lizzie Mccabe PGY3: I received sign out on this patient. I have reassessed patient and agree with the current plan. Will follow-up labs/imaging. Lizzie Mccabe MD PGY3: Patient with CT showing cavernoma, neuro evaluated patient, admit for r/o stroke. Patient EKG NSR 66BPM.

## 2023-10-13 NOTE — ED ADULT NURSE REASSESSMENT NOTE - NS ED NURSE REASSESS COMMENT FT1
pt received A&Ox4, c/o residual weakness. RUE and RLE noted to be weaker than L. PMHx stroke, COPD, HTN, prediabetic (Metformin), daily drinker. no acute distress noted. pt calm, cooperative at this time, no tremors, diaphoresis noted. respirations even and unlabored. VS as noted in flowsheet. awaiting neuro recommendations. safety maintained, side rails up

## 2023-10-13 NOTE — ED PROVIDER NOTE - ATTENDING CONTRIBUTION TO CARE
72 year old with cva and prior right side weakness presents with worsening right sided weakness for more than 24 hours. concern for new cva or hemorrhage or subdural will get ct head. neuro cx. Will obtain cbc to rule out anemia. Will obtain CMP to rule out electrolyte abnormalities, liver failure or renal failure. ua for uti. Will obtain a chest xray to rule out PNA, PTX or pleural effusion likely admit fo rmri and pt/ot

## 2023-10-14 DIAGNOSIS — Z86.73 PERSONAL HISTORY OF TRANSIENT ISCHEMIC ATTACK (TIA), AND CEREBRAL INFARCTION WITHOUT RESIDUAL DEFICITS: ICD-10-CM

## 2023-10-14 DIAGNOSIS — R53.1 WEAKNESS: ICD-10-CM

## 2023-10-14 DIAGNOSIS — E78.5 HYPERLIPIDEMIA, UNSPECIFIED: ICD-10-CM

## 2023-10-14 DIAGNOSIS — E87.1 HYPO-OSMOLALITY AND HYPONATREMIA: ICD-10-CM

## 2023-10-14 DIAGNOSIS — E11.65 TYPE 2 DIABETES MELLITUS WITH HYPERGLYCEMIA: ICD-10-CM

## 2023-10-14 DIAGNOSIS — J44.9 CHRONIC OBSTRUCTIVE PULMONARY DISEASE, UNSPECIFIED: ICD-10-CM

## 2023-10-14 DIAGNOSIS — Z29.9 ENCOUNTER FOR PROPHYLACTIC MEASURES, UNSPECIFIED: ICD-10-CM

## 2023-10-14 DIAGNOSIS — I10 ESSENTIAL (PRIMARY) HYPERTENSION: ICD-10-CM

## 2023-10-14 LAB
A1C WITH ESTIMATED AVERAGE GLUCOSE RESULT: 5.5 % — SIGNIFICANT CHANGE UP (ref 4–5.6)
ANION GAP SERPL CALC-SCNC: 13 MMOL/L — SIGNIFICANT CHANGE UP (ref 7–14)
BUN SERPL-MCNC: 11 MG/DL — SIGNIFICANT CHANGE UP (ref 7–23)
CALCIUM SERPL-MCNC: 9.2 MG/DL — SIGNIFICANT CHANGE UP (ref 8.4–10.5)
CHLORIDE SERPL-SCNC: 92 MMOL/L — LOW (ref 98–107)
CHOLEST SERPL-MCNC: 138 MG/DL — SIGNIFICANT CHANGE UP
CO2 SERPL-SCNC: 23 MMOL/L — SIGNIFICANT CHANGE UP (ref 22–31)
CREAT SERPL-MCNC: 0.64 MG/DL — SIGNIFICANT CHANGE UP (ref 0.5–1.3)
EGFR: 102 ML/MIN/1.73M2 — SIGNIFICANT CHANGE UP
ESTIMATED AVERAGE GLUCOSE: 111 — SIGNIFICANT CHANGE UP
GLUCOSE BLDC GLUCOMTR-MCNC: 111 MG/DL — HIGH (ref 70–99)
GLUCOSE BLDC GLUCOMTR-MCNC: 120 MG/DL — HIGH (ref 70–99)
GLUCOSE BLDC GLUCOMTR-MCNC: 129 MG/DL — HIGH (ref 70–99)
GLUCOSE BLDC GLUCOMTR-MCNC: 167 MG/DL — HIGH (ref 70–99)
GLUCOSE SERPL-MCNC: 99 MG/DL — SIGNIFICANT CHANGE UP (ref 70–99)
HCT VFR BLD CALC: 41.8 % — SIGNIFICANT CHANGE UP (ref 39–50)
HDLC SERPL-MCNC: 64 MG/DL — SIGNIFICANT CHANGE UP
HGB BLD-MCNC: 14.9 G/DL — SIGNIFICANT CHANGE UP (ref 13–17)
LIPID PNL WITH DIRECT LDL SERPL: 67 MG/DL — SIGNIFICANT CHANGE UP
MAGNESIUM SERPL-MCNC: 1.7 MG/DL — SIGNIFICANT CHANGE UP (ref 1.6–2.6)
MCHC RBC-ENTMCNC: 34 PG — SIGNIFICANT CHANGE UP (ref 27–34)
MCHC RBC-ENTMCNC: 35.6 GM/DL — SIGNIFICANT CHANGE UP (ref 32–36)
MCV RBC AUTO: 95.4 FL — SIGNIFICANT CHANGE UP (ref 80–100)
NON HDL CHOLESTEROL: 74 MG/DL — SIGNIFICANT CHANGE UP
NRBC # BLD: 0 /100 WBCS — SIGNIFICANT CHANGE UP (ref 0–0)
NRBC # FLD: 0 K/UL — SIGNIFICANT CHANGE UP (ref 0–0)
OSMOLALITY SERPL: 267 MOSM/KG — LOW (ref 275–295)
OSMOLALITY UR: 697 MOSM/KG — SIGNIFICANT CHANGE UP (ref 50–1200)
PHOSPHATE SERPL-MCNC: 3.3 MG/DL — SIGNIFICANT CHANGE UP (ref 2.5–4.5)
PLATELET # BLD AUTO: 226 K/UL — SIGNIFICANT CHANGE UP (ref 150–400)
POTASSIUM SERPL-MCNC: 3.9 MMOL/L — SIGNIFICANT CHANGE UP (ref 3.5–5.3)
POTASSIUM SERPL-SCNC: 3.9 MMOL/L — SIGNIFICANT CHANGE UP (ref 3.5–5.3)
RBC # BLD: 4.38 M/UL — SIGNIFICANT CHANGE UP (ref 4.2–5.8)
RBC # FLD: 12.7 % — SIGNIFICANT CHANGE UP (ref 10.3–14.5)
SODIUM SERPL-SCNC: 128 MMOL/L — LOW (ref 135–145)
SODIUM UR-SCNC: 76 MMOL/L — SIGNIFICANT CHANGE UP
TRIGL SERPL-MCNC: 37 MG/DL — SIGNIFICANT CHANGE UP
TSH SERPL-MCNC: 2.29 UIU/ML — SIGNIFICANT CHANGE UP (ref 0.27–4.2)
WBC # BLD: 7.17 K/UL — SIGNIFICANT CHANGE UP (ref 3.8–10.5)
WBC # FLD AUTO: 7.17 K/UL — SIGNIFICANT CHANGE UP (ref 3.8–10.5)

## 2023-10-14 PROCEDURE — 71045 X-RAY EXAM CHEST 1 VIEW: CPT | Mod: 26

## 2023-10-14 PROCEDURE — 93306 TTE W/DOPPLER COMPLETE: CPT | Mod: 26

## 2023-10-14 PROCEDURE — 99223 1ST HOSP IP/OBS HIGH 75: CPT

## 2023-10-14 RX ORDER — DEXTROSE 50 % IN WATER 50 %
25 SYRINGE (ML) INTRAVENOUS ONCE
Refills: 0 | Status: DISCONTINUED | OUTPATIENT
Start: 2023-10-14 | End: 2023-10-16

## 2023-10-14 RX ORDER — ASPIRIN/CALCIUM CARB/MAGNESIUM 324 MG
81 TABLET ORAL DAILY
Refills: 0 | Status: DISCONTINUED | OUTPATIENT
Start: 2023-10-14 | End: 2023-10-14

## 2023-10-14 RX ORDER — LISINOPRIL 2.5 MG/1
20 TABLET ORAL DAILY
Refills: 0 | Status: DISCONTINUED | OUTPATIENT
Start: 2023-10-14 | End: 2023-10-16

## 2023-10-14 RX ORDER — METFORMIN HYDROCHLORIDE 850 MG/1
1 TABLET ORAL
Refills: 0 | DISCHARGE

## 2023-10-14 RX ORDER — METOPROLOL TARTRATE 50 MG
50 TABLET ORAL
Refills: 0 | Status: DISCONTINUED | OUTPATIENT
Start: 2023-10-14 | End: 2023-10-16

## 2023-10-14 RX ORDER — DEXTROSE 50 % IN WATER 50 %
15 SYRINGE (ML) INTRAVENOUS ONCE
Refills: 0 | Status: DISCONTINUED | OUTPATIENT
Start: 2023-10-14 | End: 2023-10-16

## 2023-10-14 RX ORDER — SODIUM CHLORIDE 9 MG/ML
1000 INJECTION, SOLUTION INTRAVENOUS
Refills: 0 | Status: DISCONTINUED | OUTPATIENT
Start: 2023-10-14 | End: 2023-10-16

## 2023-10-14 RX ORDER — ENOXAPARIN SODIUM 100 MG/ML
40 INJECTION SUBCUTANEOUS EVERY 24 HOURS
Refills: 0 | Status: DISCONTINUED | OUTPATIENT
Start: 2023-10-14 | End: 2023-10-16

## 2023-10-14 RX ORDER — INSULIN LISPRO 100/ML
VIAL (ML) SUBCUTANEOUS AT BEDTIME
Refills: 0 | Status: DISCONTINUED | OUTPATIENT
Start: 2023-10-14 | End: 2023-10-16

## 2023-10-14 RX ORDER — INSULIN LISPRO 100/ML
VIAL (ML) SUBCUTANEOUS
Refills: 0 | Status: DISCONTINUED | OUTPATIENT
Start: 2023-10-14 | End: 2023-10-16

## 2023-10-14 RX ORDER — ALBUTEROL 90 UG/1
2 AEROSOL, METERED ORAL EVERY 6 HOURS
Refills: 0 | Status: DISCONTINUED | OUTPATIENT
Start: 2023-10-14 | End: 2023-10-16

## 2023-10-14 RX ORDER — FOLIC ACID 0.8 MG
1 TABLET ORAL DAILY
Refills: 0 | Status: DISCONTINUED | OUTPATIENT
Start: 2023-10-14 | End: 2023-10-16

## 2023-10-14 RX ORDER — GLUCAGON INJECTION, SOLUTION 0.5 MG/.1ML
1 INJECTION, SOLUTION SUBCUTANEOUS ONCE
Refills: 0 | Status: DISCONTINUED | OUTPATIENT
Start: 2023-10-14 | End: 2023-10-16

## 2023-10-14 RX ORDER — ATORVASTATIN CALCIUM 80 MG/1
1 TABLET, FILM COATED ORAL
Refills: 0 | DISCHARGE

## 2023-10-14 RX ORDER — BUDESONIDE AND FORMOTEROL FUMARATE DIHYDRATE 160; 4.5 UG/1; UG/1
2 AEROSOL RESPIRATORY (INHALATION)
Refills: 0 | Status: DISCONTINUED | OUTPATIENT
Start: 2023-10-14 | End: 2023-10-16

## 2023-10-14 RX ORDER — ASPIRIN/CALCIUM CARB/MAGNESIUM 324 MG
81 TABLET ORAL DAILY
Refills: 0 | Status: DISCONTINUED | OUTPATIENT
Start: 2023-10-14 | End: 2023-10-15

## 2023-10-14 RX ORDER — DEXTROSE 50 % IN WATER 50 %
12.5 SYRINGE (ML) INTRAVENOUS ONCE
Refills: 0 | Status: DISCONTINUED | OUTPATIENT
Start: 2023-10-14 | End: 2023-10-16

## 2023-10-14 RX ORDER — ATORVASTATIN CALCIUM 80 MG/1
40 TABLET, FILM COATED ORAL AT BEDTIME
Refills: 0 | Status: DISCONTINUED | OUTPATIENT
Start: 2023-10-14 | End: 2023-10-16

## 2023-10-14 RX ORDER — FOLIC ACID 0.8 MG
1 TABLET ORAL
Refills: 0 | DISCHARGE

## 2023-10-14 RX ADMIN — BUDESONIDE AND FORMOTEROL FUMARATE DIHYDRATE 2 PUFF(S): 160; 4.5 AEROSOL RESPIRATORY (INHALATION) at 09:52

## 2023-10-14 RX ADMIN — Medication 50 MILLIGRAM(S): at 04:28

## 2023-10-14 RX ADMIN — Medication 1: at 17:51

## 2023-10-14 RX ADMIN — Medication 1 MILLIGRAM(S): at 17:50

## 2023-10-14 RX ADMIN — BUDESONIDE AND FORMOTEROL FUMARATE DIHYDRATE 2 PUFF(S): 160; 4.5 AEROSOL RESPIRATORY (INHALATION) at 21:50

## 2023-10-14 RX ADMIN — ENOXAPARIN SODIUM 40 MILLIGRAM(S): 100 INJECTION SUBCUTANEOUS at 04:28

## 2023-10-14 RX ADMIN — ATORVASTATIN CALCIUM 40 MILLIGRAM(S): 80 TABLET, FILM COATED ORAL at 21:50

## 2023-10-14 RX ADMIN — Medication 50 MILLIGRAM(S): at 17:50

## 2023-10-14 RX ADMIN — LISINOPRIL 20 MILLIGRAM(S): 2.5 TABLET ORAL at 04:28

## 2023-10-14 NOTE — H&P ADULT - PROBLEM SELECTOR PROBLEM 6
Chronic obstructive pulmonary disease (COPD) Type 2 diabetes mellitus with hyperglycemia, without long-term current use of insulin

## 2023-10-14 NOTE — H&P ADULT - PROBLEM SELECTOR PLAN 2
Na 128. Not on meds that can cause this. Does not appear dry on exam. Received 1L IVF in ED  -Uosm, Sosm, repeat BMP in AM  -MRI brain r/o brain lesion

## 2023-10-14 NOTE — PHYSICAL THERAPY INITIAL EVALUATION ADULT - ACTIVE RANGE OF MOTION EXAMINATION, REHAB EVAL
except right wrist and finger ROM limited with passive ROM WFL; right ankle df/pf 0-3 degrees/bilateral upper extremity Active ROM was WFL (within functional limits)/bilateral  lower extremity Active ROM was WFL (within functional limits)

## 2023-10-14 NOTE — H&P ADULT - NSHPPHYSICALEXAM_GEN_ALL_CORE
PHYSICAL EXAM:  Vital Signs Last 24 Hrs  T(C): 37 (10-13-23 @ 23:56)  T(F): 98.6 (10-13-23 @ 23:56), Max: 98.6 (10-13-23 @ 23:56)  HR: 66 (10-13-23 @ 23:56) (64 - 73)  BP: 146/78 (10-13-23 @ 23:56)  BP(mean): --  RR: 17 (10-13-23 @ 23:56) (16 - 17)  SpO2: 95% (10-13-23 @ 23:56) (95% - 100%)  Wt(kg): --    Constitutional: NAD, awake and alert, well developed  EYES: EOMI, conjunctiva clear  ENT:  Normal Hearing, no tonsillar exudates   Neck: Soft and supple , no thyromegaly   Respiratory: Breath sounds are clear bilaterally, No wheezing, rales or rhonchi, no tachypnea, no accessory muscle use  Cardiovascular: S1 and S2, regular rate and rhythm, no Murmurs, gallops or rubs, no JVD, no leg edema  Gastrointestinal: Bowel Sounds present, soft, nontender, nondistended, no guarding, no rebound  Extremities: No cyanosis or clubbing; warm to touch  Vascular: 2+ peripheral pulses lower ex  Neurological: CN II-XII intact bilaterally, sensation to light touch intact in all extremities. Pupils are equally reactive to light and symmetrical in size.   LUE and LLE 5/5  RUE 4/5  strength, 4/5 biceps, 5/5 triceps  RLE 5/5 throughout  Skin: No rashes, no ulcerations PHYSICAL EXAM:  Vital Signs Last 24 Hrs  T(C): 37 (10-13-23 @ 23:56)  T(F): 98.6 (10-13-23 @ 23:56), Max: 98.6 (10-13-23 @ 23:56)  HR: 66 (10-13-23 @ 23:56) (64 - 73)  BP: 146/78 (10-13-23 @ 23:56)  BP(mean): --  RR: 17 (10-13-23 @ 23:56) (16 - 17)  SpO2: 95% (10-13-23 @ 23:56) (95% - 100%)  Wt(kg): --    Constitutional: NAD, awake and alert, well developed  EYES: EOMI, conjunctiva clear  ENT:  Normal Hearing, no tonsillar exudates   Neck: Soft and supple , no thyromegaly   Respiratory: +mild wheezing, rales or rhonchi, no tachypnea, no accessory muscle use  Cardiovascular: S1 and S2, regular rate and rhythm, no Murmurs, gallops or rubs, no JVD, no leg edema  Gastrointestinal: Bowel Sounds present, soft, nontender, nondistended, no guarding, no rebound  Extremities: No cyanosis or clubbing; warm to touch  Vascular: 2+ peripheral pulses lower ex  Neurological: CN II-XII intact bilaterally, sensation to light touch intact in all extremities. Pupils are equally reactive to light and symmetrical in size.   LUE and LLE 5/5  RUE 4/5  strength, 4/5 biceps, 5/5 triceps  RLE 5/5 throughout  Skin: No rashes, no ulcerations

## 2023-10-14 NOTE — H&P ADULT - HISTORY OF PRESENT ILLNESS
72M with PMHx HTN, HLD, DM2, CVA with R sided weakness 10 years ago, COPD presenting with right sided weakness x1 day. The patient has chronic R sided arm and leg weakness and ambulates with a cane. Yesterday around 1PM 10/12 he had more R hand weakness. He is unsure if he has numbness but the side feels different than the left. He also had some gait unsteadiness as well with his cane. Denies dizziness, LH, CP, SOB, palpitations, urinary/bowel incontinence. He notes his last CVA was 10 years ago and then he was told of a cavernoma and to no longer be on ASA by his neurologist. Of note patient takes pulmicort and symbicort for COPD. He has wheezing on exam but denies SOB and cough at this time and is comfortable    Med list obtained from wife

## 2023-10-14 NOTE — PHYSICAL THERAPY INITIAL EVALUATION ADULT - ADDITIONAL COMMENTS
Patient lives with his wife in a house +4 steps to enter and +12 steps to bedroom. Pt owns a tub chair.

## 2023-10-14 NOTE — PHYSICAL THERAPY INITIAL EVALUATION ADULT - MANUAL MUSCLE TESTING RESULTS, REHAB EVAL
Right Upper Extremity 3-/5 and right Lower Extremity 4+/5; except right ankle df/pf 3-/5 within available range; Left Upper Extremity and Lower Extremity 5/5

## 2023-10-14 NOTE — PHYSICAL THERAPY INITIAL EVALUATION ADULT - PLANNED THERAPY INTERVENTIONS, PT EVAL
Patient left positioned for safety in bed in NAD, call bell in reach, all lines intact. Wife at bedside./balance training/bed mobility training/gait training/ROM/strengthening/transfer training

## 2023-10-14 NOTE — H&P ADULT - NSICDXPASTMEDICALHX_GEN_ALL_CORE_FT
PAST MEDICAL HISTORY:  COPD (chronic obstructive pulmonary disease)     HLD (hyperlipidemia)     Hypertension     Right sided weakness     Stroke     Type 2 diabetes mellitus

## 2023-10-14 NOTE — H&P ADULT - PROBLEM SELECTOR PLAN 4
Permissive HTN 24 hours after sx onset  -resume lisinopril and metoprolol in AM as will be >24 hours since sx onset

## 2023-10-14 NOTE — PHYSICAL THERAPY INITIAL EVALUATION ADULT - GENERAL OBSERVATIONS, REHAB EVAL
Pt found in bed; patient agreeable to PT; HR 66bpm; +telemetry monitor; +right Upper Extremity and Lower Extremity weakness noted. Wife at bedside. Wife reports patient with baseline right Upper Extremity and Lower Extremity weakness from previous CVA. Pt's speech appears garbled at times but pt's wife reports this is his baseline.

## 2023-10-14 NOTE — H&P ADULT - NSHPREVIEWOFSYSTEMS_GEN_ALL_CORE
ROS:    Constitutional: [ ] fevers [ ] chills   HEENT: [ ] postnasal drip [ ] nasal congestion  CV: [ ] chest pain [ ] orthopnea [ ] palpitations   Resp: [ ] cough [ ] shortness of breath [ ] dyspnea [ ] wheezing   GI: [ ] nausea [ ] vomiting [ ] diarrhea [ ] constipation [ ] abd pain   : [ ] dysuria [ ] increased urinary frequency  Musculoskeletal: [ ] back pain [ ] myalgias [ ] arthralgias   Skin: [ ] rash [ ] itch  Neurological: [ ] headache [ ] dizziness [ ] syncope [x ] weakness [x ] numbness  Endocrine: [ ] diabetes [ ] thyroid problem  Hematologic/Lymphatic: [ ] anemia [ ] bleeding problem  [x ] All other systems negative

## 2023-10-14 NOTE — H&P ADULT - NSHPLABSRESULTS_GEN_ALL_CORE
Writer spoke with Dr. Briggs and informed of below message.Per MD, pt has two options- Can be seen in the walk-in department to make sure that he is not dehydrated and get hydration or be seen at the end of the day.  Writer spoke with mom and wants to be sure that MD is aware that pt had 3 abscess prior to having tonsils removed. Is concerned that pt may have another abscess and would like to be seen today. Pt is scheduled for 4:40pm.   Personally reviewed labs:                        16.0   6.85  )-----------( 228      ( 13 Oct 2023 18:00 )             44.9     10-13-23 @ 18:00    128<L>  |  91<L>  |  13             --------------------------< 123<H>     4.1  |  24  | 0.69    eGFR AA: --  eGFR N-AA: --    Calcium: 10.0  Phosphorus: 2.8  Magnesium: 1.70    AST: 20    ALT: 16  AlkPhos: 81  Protein: 7.6  Albumin: 4.7  TBili: 0.8  D-Bili: --          RADIOLOGY & ADDITIONAL TESTS:    EKG my independent interpretation: NSR, no STD or BOGDAN    CXR ordered    Imaging personally reviewed:  CTH noncontrast:  IMPRESSION:  Subtle hyperdense focus in the left frontal subcortical white matter   which has a linear hyperdense component associated with it possibly a DVA   with cavernoma. Follow-up with contrast-enhanced brain MRI is recommended   for further characterization. No definite acute intracranial hemorrhage   vasogenic edema or midline shift. Moderate chronic microvascular changes   and chronic left corona radiata lacunar infarct.

## 2023-10-14 NOTE — H&P ADULT - PROBLEM SELECTOR PLAN 1
Right arm and leg weakness worsened from already weakened baseline. No sensory deficits on exam. CN intact. Possible CVA and will undergo w/u to r/o. Has hx of cavernoma that needs MRI for evaluation  -neuro consulted  -tele  -echo with bubble  -ILR as outpatient per neuro  -MRI brain with and without to evaluate carvernoma and r/o stroke  -hold off further ASA for now pending neuro final recs and MRI given hx carvernoma  -MRA head without contrast and neck with IV contrast  -PT/OT eval  -passed dysphagia screen  -c/w statin  -lipids, A1C, TSH

## 2023-10-14 NOTE — PHYSICAL THERAPY INITIAL EVALUATION ADULT - GAIT DEVIATIONS NOTED, PT EVAL
decreased right Lower Extremity heel strike/decreased munira/decreased step length/decreased weight-shifting ability

## 2023-10-14 NOTE — PATIENT PROFILE ADULT - NSTOBACCOCESSATIONEDU1_GEN_A_NUR
Ambulatory
Recognize danger situations.  For example, stress, drinking alcohol, urges to smoke, smoking cues, cigarette availability

## 2023-10-14 NOTE — OCCUPATIONAL THERAPY INITIAL EVALUATION ADULT - RUE MMT, REHAB EVAL
4/5 shoulder and elbow; 3+/5 hand strength 4/5 shoulder and elbow; 3+/5 hand strength, wrist/digit extension 3-/5

## 2023-10-14 NOTE — H&P ADULT - ASSESSMENT
72M with PMHx HTN, HLD, DM2, CVA with R sided weakness 10 years ago, COPD presenting with right sided weakness x1 day. Admitted for stroke workup

## 2023-10-14 NOTE — OCCUPATIONAL THERAPY INITIAL EVALUATION ADULT - GENERAL OBSERVATIONS, REHAB EVAL
Patient received semisupine in bed in NAD. Wife bedside . Patient agrees to participate in OT evaluation. /69mmHg . Patient left semisupine in bed in NAD. Patient tolerated OT evaluation well.

## 2023-10-14 NOTE — OCCUPATIONAL THERAPY INITIAL EVALUATION ADULT - ADDITIONAL COMMENTS
Patient lives with his wife in a house +4 steps to enter and +12 steps to bedroom. He was independent with ADLs and utilized a cane outside the house. He has a tub shower and uses a shower chair.

## 2023-10-14 NOTE — OCCUPATIONAL THERAPY INITIAL EVALUATION ADULT - DIAGNOSIS, OT EVAL
s/p right sided weakness; decreased ADL ability; decreased functional mobility; decreased fine motor skills

## 2023-10-14 NOTE — PHYSICAL THERAPY INITIAL EVALUATION ADULT - PERTINENT HX OF CURRENT PROBLEM, REHAB EVAL
72 year old male with history of  HTN, HLD, DM2, CVA with Right sided weakness 10 years ago, COPD presenting with right sided weakness x1 day. Admitted for stroke workup. CT head shows Subtle hyperdense focus in the left frontal subcortical white matter which has a linear hyperdense component associated with it possibly a DVA with cavernoma. No definite acute intracranial hemorrhage   vasogenic edema or midline shift. Moderate chronic microvascular changes and chronic left corona radiata lacunar infarct. Pending MRI.

## 2023-10-15 LAB
GLUCOSE BLDC GLUCOMTR-MCNC: 113 MG/DL — HIGH (ref 70–99)
GLUCOSE BLDC GLUCOMTR-MCNC: 130 MG/DL — HIGH (ref 70–99)
GLUCOSE BLDC GLUCOMTR-MCNC: 94 MG/DL — SIGNIFICANT CHANGE UP (ref 70–99)
GLUCOSE BLDC GLUCOMTR-MCNC: 97 MG/DL — SIGNIFICANT CHANGE UP (ref 70–99)
GLUCOSE BLDC GLUCOMTR-MCNC: 97 MG/DL — SIGNIFICANT CHANGE UP (ref 70–99)
HCV AB S/CO SERPL IA: 0.14 S/CO — SIGNIFICANT CHANGE UP (ref 0–0.99)
HCV AB SERPL-IMP: SIGNIFICANT CHANGE UP

## 2023-10-15 PROCEDURE — 70548 MR ANGIOGRAPHY NECK W/DYE: CPT | Mod: 26

## 2023-10-15 PROCEDURE — 99233 SBSQ HOSP IP/OBS HIGH 50: CPT

## 2023-10-15 PROCEDURE — 70544 MR ANGIOGRAPHY HEAD W/O DYE: CPT | Mod: 26,59

## 2023-10-15 PROCEDURE — 70553 MRI BRAIN STEM W/O & W/DYE: CPT | Mod: 26

## 2023-10-15 RX ADMIN — Medication 50 MILLIGRAM(S): at 05:46

## 2023-10-15 RX ADMIN — BUDESONIDE AND FORMOTEROL FUMARATE DIHYDRATE 2 PUFF(S): 160; 4.5 AEROSOL RESPIRATORY (INHALATION) at 08:59

## 2023-10-15 RX ADMIN — ATORVASTATIN CALCIUM 40 MILLIGRAM(S): 80 TABLET, FILM COATED ORAL at 22:22

## 2023-10-15 RX ADMIN — ENOXAPARIN SODIUM 40 MILLIGRAM(S): 100 INJECTION SUBCUTANEOUS at 04:14

## 2023-10-15 RX ADMIN — Medication 50 MILLIGRAM(S): at 18:15

## 2023-10-15 RX ADMIN — LISINOPRIL 20 MILLIGRAM(S): 2.5 TABLET ORAL at 05:46

## 2023-10-15 RX ADMIN — Medication 81 MILLIGRAM(S): at 11:23

## 2023-10-15 RX ADMIN — Medication 1 MILLIGRAM(S): at 11:23

## 2023-10-15 RX ADMIN — BUDESONIDE AND FORMOTEROL FUMARATE DIHYDRATE 2 PUFF(S): 160; 4.5 AEROSOL RESPIRATORY (INHALATION) at 22:22

## 2023-10-15 NOTE — PROGRESS NOTE ADULT - PROBLEM SELECTOR PLAN 1
Right arm and leg weakness worsened from already weakened baseline. Possible acute CVA  -neuro following   -tele monitoring   -echo with normal LV function (EF 64 %), no wall motion abnormalities, normal atria   -ILR as outpatient per neuro  -MRI brain with and without to evaluate carvernoma and r/o stroke  -MRA head without contrast and neck with IV contrast  -started ASA, c/w statin  -PT/OT eval  -passed dysphagia screen Right arm and leg weakness worsened from already weakened baseline. Possible acute CVA  -CT head w/ a subtle hyperdense focus in the left frontal subcortical white matter which has a linear hyperdense component associated with it possibly a DVA with cavernoma.  -tele monitoring   -echo with normal LV function (EF 64 %), no wall motion abnormalities, normal atria   -ILR as outpatient per neuro  -MRI brain with and without to evaluate carvernoma and r/o stroke  -MRA head without contrast and neck with IV contrast  -c/w statin, spoke w/ neuro and will hold off on antiplatelets for now   -PT/OT eval  -passed dysphagia screen  -neuro following

## 2023-10-15 NOTE — PROGRESS NOTE ADULT - SUBJECTIVE AND OBJECTIVE BOX
PROGRESS NOTE:     Patient is a 72y old  Male who presents with a chief complaint of right sided weakness (14 Oct 2023 03:04)      SUBJECTIVE / OVERNIGHT EVENTS: R sided weakness slightly improved.     ADDITIONAL REVIEW OF SYSTEMS:    MEDICATIONS  (STANDING):  aspirin  chewable 81 milliGRAM(s) Oral daily  atorvastatin 40 milliGRAM(s) Oral at bedtime  budesonide 160 MICROgram(s)/formoterol 4.5 MICROgram(s) Inhaler 2 Puff(s) Inhalation two times a day  dextrose 5%. 1000 milliLiter(s) (50 mL/Hr) IV Continuous <Continuous>  dextrose 5%. 1000 milliLiter(s) (100 mL/Hr) IV Continuous <Continuous>  dextrose 50% Injectable 25 Gram(s) IV Push once  dextrose 50% Injectable 12.5 Gram(s) IV Push once  dextrose 50% Injectable 25 Gram(s) IV Push once  enoxaparin Injectable 40 milliGRAM(s) SubCutaneous every 24 hours  folic acid 1 milliGRAM(s) Oral daily  glucagon  Injectable 1 milliGRAM(s) IntraMuscular once  insulin lispro (ADMELOG) corrective regimen sliding scale   SubCutaneous three times a day before meals  insulin lispro (ADMELOG) corrective regimen sliding scale   SubCutaneous at bedtime  lisinopril 20 milliGRAM(s) Oral daily  metoprolol tartrate 50 milliGRAM(s) Oral two times a day    MEDICATIONS  (PRN):  albuterol    90 MICROgram(s) HFA Inhaler 2 Puff(s) Inhalation every 6 hours PRN Shortness of Breath and/or Wheezing  dextrose Oral Gel 15 Gram(s) Oral once PRN Blood Glucose LESS THAN 70 milliGRAM(s)/deciliter      CAPILLARY BLOOD GLUCOSE      POCT Blood Glucose.: 97 mg/dL (15 Oct 2023 09:04)  POCT Blood Glucose.: 120 mg/dL (14 Oct 2023 21:51)  POCT Blood Glucose.: 167 mg/dL (14 Oct 2023 17:38)  POCT Blood Glucose.: 111 mg/dL (14 Oct 2023 13:24)    I&O's Summary    14 Oct 2023 07:01  -  15 Oct 2023 07:00  --------------------------------------------------------  IN: 980 mL / OUT: 1000 mL / NET: -20 mL        PHYSICAL EXAM:  Vital Signs Last 24 Hrs  T(C): 36.4 (15 Oct 2023 05:45), Max: 36.8 (14 Oct 2023 11:45)  T(F): 97.5 (15 Oct 2023 05:45), Max: 98.3 (15 Oct 2023 01:45)  HR: 73 (15 Oct 2023 05:45) (61 - 73)  BP: 132/60 (15 Oct 2023 05:45) (130/79 - 138/89)  BP(mean): --  RR: 17 (15 Oct 2023 05:45) (17 - 18)  SpO2: 95% (15 Oct 2023 05:45) (92% - 97%)    Parameters below as of 15 Oct 2023 05:45  Patient On (Oxygen Delivery Method): room air      Constitutional: NAD  EYES: EOMI, conjunctiva clear  ENT:  Normal Hearing, no tonsillar exudates   Neck: Soft and supple , no thyromegaly   Respiratory: +mild wheezing, no rales or rhonchi, no tachypnea, no accessory muscle use  Cardiovascular: S1 and S2, regular rate and rhythm, no Murmurs, gallops or rubs, no JVD, no leg edema  Gastrointestinal: Bowel Sounds present, soft, nontender, nondistended, no guarding, no rebound  Extremities: No cyanosis or clubbing; warm to touch  Vascular: 2+ peripheral pulses lower ex  Neurological: CN II-XII intact bilaterally, R sided weakness   Skin: No rashes, no ulcerations    LABS:                        14.9   7.17  )-----------( 226      ( 14 Oct 2023 05:18 )             41.8     10-14    128<L>  |  92<L>  |  11  ----------------------------<  99  3.9   |  23  |  0.64    Ca    9.2      14 Oct 2023 05:18  Phos  3.3     10-14  Mg     1.70     10-14    TPro  7.6  /  Alb  4.7  /  TBili  0.8  /  DBili  x   /  AST  20  /  ALT  16  /  AlkPhos  81  10-13    PT/INR - ( 13 Oct 2023 18:00 )   PT: 10.1 sec;   INR: <0.90 ratio         PTT - ( 13 Oct 2023 18:00 )  PTT:31.5 sec      Urinalysis Basic - ( 14 Oct 2023 05:18 )    Color: x / Appearance: x / SG: x / pH: x  Gluc: 99 mg/dL / Ketone: x  / Bili: x / Urobili: x   Blood: x / Protein: x / Nitrite: x   Leuk Esterase: x / RBC: x / WBC x   Sq Epi: x / Non Sq Epi: x / Bacteria: x          RADIOLOGY & ADDITIONAL TESTS:  Results Reviewed:   Imaging Personally Reviewed:  Electrocardiogram Personally Reviewed:    COORDINATION OF CARE:  Care Discussed with Consultants/Other Providers [Y/N]:  Prior or Outpatient Records Reviewed [Y/N]:

## 2023-10-15 NOTE — CHART NOTE - NSCHARTNOTEFT_GEN_A_CORE
72-year-old right-handed male w/ PMHx HTN, HLD DM, stroke (10 years ago, residual R hemiparesis, ambulates w/ cane, not on antithrombotics), cavernoma (R frontal), COPD/emphysema, active smoker (55 pack year), chronic memory issues, p/w new +/- worsening of residual right sided weakness. Patient is a poor historian. Per wife, right face is at baseline; distal RUE is profoundly weaker than baseline; RLE is possibly weaker, but not clear. Per wife, stroke was thought to be secondary to bleed from cavernoma. However, worth noting that cavernoma on our CT head is on the R, and patient's symptoms are on the R. Otherwise negative general and neurological review of system. VS in ED: /65, HR 64, afebrile. Exam: R NLF, RUE paresis (distal >> proximal), RLE paresis (distal > proximal), clear Babinski on R. Pertinent labs: sodium 128, glucose 123, CBC wnl, coags wnl. CT head: subtle hyperdensity in R frontal subcortical along periventricular WM, chronic L corona radiata lacunar infarct.    mRS: 1  LKN: 13:00 PM 10/12/23  NIHSS: 2 (R NLF, mild RUE drift)    MRI brain was checked and reviewed: per below:    1.0 x 0.5 cm focus of restricted diffusion within the left   posterior corona radiata, compatible with an acute lacunar infarct.   Multiple additional chronic lacunar infarcts in the bilateral basal   ganglia and left thalamus are reidentified. Small focal chronic infarct   in the left frontal deep white matter is reidentified. 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 There is a developmental venous anomaly within the medial   right frontal lobe is reidentified. Multiple patchy and small foci of   T2/FLAIR hyperintense signal in the periventricular white matter,   compatible with mild to moderate chronic microvascular ischemic change.    MRA brain: No definite hemodynamically significant stenosis. There is   artifact through portions of the posterior cerebral arteries limiting   evaluation, however are patent distal to the areas of artifact.    Congenital anomalies as above.    MRA neck: No hemodynamically significant stenosis      < end of copied text >    Plan:  [] please resume aspirin 81 mg daily if patient is able to tolerate PO intake    case discussed with stroke fellow Dr. Mcmanus
"MRI brain: 1.0 x 0.5 cm focus of restricted diffusion within the left   posterior corona radiata, compatible with an acute lacunar infarct.   Multiple additional chronic lacunar infarcts in the bilateral basal   ganglia and left thalamus are reidentified. Small focal chronic infarct   in the left frontal deep white matter is reidentified. 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 There is a developmental venous anomaly within the medial   right frontal lobe is reidentified. Multiple patchy and small foci of   T2/FLAIR hyperintense signal in the periventricular white matter,   compatible with mild to moderate chronic microvascular ischemic change.    MRA brain: No definite hemodynamically significant stenosis. There is   artifact through portions of the posterior cerebral arteries limiting   evaluation, however are patent distal to the areas of artifact.    Congenital anomalies as above.    MRA neck: No hemodynamically significant stenosis"      Neurology notified of MRI results. ACP team to follow up recommendations.    David Mondragon PA-C,   Internal Medicine ACP   In house pager #[phone]
72M with PMHx HTN, HLD, DM2, CVA with R sided weakness 10 years ago, COPD presenting with right sided weakness x1 day. Admitted for stroke workup. Awaiting MRI brain, MRA head and neck, echo w/ bubble study. Start ASA 81mg and c/w Lipitor. Permissive HTN x 24 hrs. PT/OT. Neuro following. Also w/ hyponatremia, monitor Na s/p IVF. Awaiting urine studies.

## 2023-10-15 NOTE — PROGRESS NOTE ADULT - PROBLEM SELECTOR PLAN 3
-MRIs as above to r/o new stroke  -c/w statin  -started ASA -MRIs as above to r/o new stroke  -c/w statin  -hold off on antiplatelets for now pending MRI

## 2023-10-15 NOTE — PROGRESS NOTE ADULT - PROBLEM SELECTOR PLAN 6
Hold metformin inpatient  Hgb A1C 5.5  c/w ISS  Monitor fingersticks, can d/c sliding scale if FS's remain below 150

## 2023-10-16 ENCOUNTER — TRANSCRIPTION ENCOUNTER (OUTPATIENT)
Age: 73
End: 2023-10-16

## 2023-10-16 VITALS
RESPIRATION RATE: 18 BRPM | OXYGEN SATURATION: 95 % | TEMPERATURE: 98 F | HEART RATE: 64 BPM | SYSTOLIC BLOOD PRESSURE: 132 MMHG | DIASTOLIC BLOOD PRESSURE: 79 MMHG

## 2023-10-16 LAB
ALBUMIN SERPL ELPH-MCNC: 3.9 G/DL — SIGNIFICANT CHANGE UP (ref 3.3–5)
ALP SERPL-CCNC: 70 U/L — SIGNIFICANT CHANGE UP (ref 40–120)
ALT FLD-CCNC: 19 U/L — SIGNIFICANT CHANGE UP (ref 4–41)
ANION GAP SERPL CALC-SCNC: 11 MMOL/L — SIGNIFICANT CHANGE UP (ref 7–14)
AST SERPL-CCNC: 27 U/L — SIGNIFICANT CHANGE UP (ref 4–40)
BILIRUB SERPL-MCNC: 0.9 MG/DL — SIGNIFICANT CHANGE UP (ref 0.2–1.2)
BUN SERPL-MCNC: 12 MG/DL — SIGNIFICANT CHANGE UP (ref 7–23)
CALCIUM SERPL-MCNC: 8.8 MG/DL — SIGNIFICANT CHANGE UP (ref 8.4–10.5)
CHLORIDE SERPL-SCNC: 95 MMOL/L — LOW (ref 98–107)
CO2 SERPL-SCNC: 24 MMOL/L — SIGNIFICANT CHANGE UP (ref 22–31)
CREAT SERPL-MCNC: 0.69 MG/DL — SIGNIFICANT CHANGE UP (ref 0.5–1.3)
EGFR: 100 ML/MIN/1.73M2 — SIGNIFICANT CHANGE UP
GLUCOSE BLDC GLUCOMTR-MCNC: 112 MG/DL — HIGH (ref 70–99)
GLUCOSE BLDC GLUCOMTR-MCNC: 116 MG/DL — HIGH (ref 70–99)
GLUCOSE SERPL-MCNC: 90 MG/DL — SIGNIFICANT CHANGE UP (ref 70–99)
HCT VFR BLD CALC: 39.2 % — SIGNIFICANT CHANGE UP (ref 39–50)
HGB BLD-MCNC: 14.2 G/DL — SIGNIFICANT CHANGE UP (ref 13–17)
MCHC RBC-ENTMCNC: 35 PG — HIGH (ref 27–34)
MCHC RBC-ENTMCNC: 36.2 GM/DL — HIGH (ref 32–36)
MCV RBC AUTO: 96.6 FL — SIGNIFICANT CHANGE UP (ref 80–100)
NRBC # BLD: 0 /100 WBCS — SIGNIFICANT CHANGE UP (ref 0–0)
NRBC # FLD: 0 K/UL — SIGNIFICANT CHANGE UP (ref 0–0)
PLATELET # BLD AUTO: 230 K/UL — SIGNIFICANT CHANGE UP (ref 150–400)
POTASSIUM SERPL-MCNC: 3.8 MMOL/L — SIGNIFICANT CHANGE UP (ref 3.5–5.3)
POTASSIUM SERPL-SCNC: 3.8 MMOL/L — SIGNIFICANT CHANGE UP (ref 3.5–5.3)
PROT SERPL-MCNC: 6.7 G/DL — SIGNIFICANT CHANGE UP (ref 6–8.3)
RBC # BLD: 4.06 M/UL — LOW (ref 4.2–5.8)
RBC # FLD: 12.9 % — SIGNIFICANT CHANGE UP (ref 10.3–14.5)
SODIUM SERPL-SCNC: 130 MMOL/L — LOW (ref 135–145)
WBC # BLD: 7.56 K/UL — SIGNIFICANT CHANGE UP (ref 3.8–10.5)
WBC # FLD AUTO: 7.56 K/UL — SIGNIFICANT CHANGE UP (ref 3.8–10.5)

## 2023-10-16 PROCEDURE — 99239 HOSP IP/OBS DSCHRG MGMT >30: CPT

## 2023-10-16 RX ORDER — FLUTICASONE FUROATE, UMECLIDINIUM BROMIDE AND VILANTEROL TRIFENATATE 200; 62.5; 25 UG/1; UG/1; UG/1
1 POWDER RESPIRATORY (INHALATION)
Qty: 0 | Refills: 0 | DISCHARGE

## 2023-10-16 RX ORDER — LISINOPRIL 2.5 MG/1
1 TABLET ORAL
Qty: 0 | Refills: 0 | DISCHARGE
Start: 2023-10-16

## 2023-10-16 RX ORDER — LISINOPRIL 2.5 MG/1
1 TABLET ORAL
Refills: 0 | DISCHARGE

## 2023-10-16 RX ORDER — POTASSIUM CHLORIDE 20 MEQ
20 PACKET (EA) ORAL ONCE
Refills: 0 | Status: COMPLETED | OUTPATIENT
Start: 2023-10-16 | End: 2023-10-16

## 2023-10-16 RX ORDER — METOPROLOL TARTRATE 50 MG
1 TABLET ORAL
Refills: 0 | DISCHARGE

## 2023-10-16 RX ORDER — BUDESONIDE, MICRONIZED 100 %
2 POWDER (GRAM) MISCELLANEOUS
Qty: 0 | Refills: 0 | DISCHARGE

## 2023-10-16 RX ORDER — ASPIRIN/CALCIUM CARB/MAGNESIUM 324 MG
1 TABLET ORAL
Qty: 0 | Refills: 0 | DISCHARGE
Start: 2023-10-16

## 2023-10-16 RX ORDER — ASPIRIN/CALCIUM CARB/MAGNESIUM 324 MG
81 TABLET ORAL DAILY
Refills: 0 | Status: DISCONTINUED | OUTPATIENT
Start: 2023-10-16 | End: 2023-10-16

## 2023-10-16 RX ORDER — NICOTINE POLACRILEX 2 MG
1 GUM BUCCAL
Qty: 1 | Refills: 0
Start: 2023-10-16 | End: 2023-10-29

## 2023-10-16 RX ORDER — METOPROLOL TARTRATE 50 MG
1 TABLET ORAL
Qty: 0 | Refills: 0 | DISCHARGE
Start: 2023-10-16

## 2023-10-16 RX ADMIN — BUDESONIDE AND FORMOTEROL FUMARATE DIHYDRATE 2 PUFF(S): 160; 4.5 AEROSOL RESPIRATORY (INHALATION) at 08:37

## 2023-10-16 RX ADMIN — LISINOPRIL 20 MILLIGRAM(S): 2.5 TABLET ORAL at 05:03

## 2023-10-16 RX ADMIN — Medication 50 MILLIGRAM(S): at 05:04

## 2023-10-16 RX ADMIN — Medication 81 MILLIGRAM(S): at 12:34

## 2023-10-16 RX ADMIN — ENOXAPARIN SODIUM 40 MILLIGRAM(S): 100 INJECTION SUBCUTANEOUS at 04:57

## 2023-10-16 RX ADMIN — Medication 1 MILLIGRAM(S): at 12:34

## 2023-10-16 RX ADMIN — Medication 20 MILLIEQUIVALENT(S): at 12:35

## 2023-10-16 NOTE — DISCHARGE NOTE PROVIDER - PROVIDER TOKENS
PROVIDER:[TOKEN:[[id number]]] PROVIDER:[TOKEN:[[id number]]],FREE:[LAST:[Dr. Chris Ruiz],PHONE:[(   )    -],FAX:[(   )    -],ADDRESS:[follow up in 1-2 weeks as needed]]

## 2023-10-16 NOTE — DISCHARGE NOTE NURSING/CASE MANAGEMENT/SOCIAL WORK - NSDCPEFALRISK_GEN_ALL_CORE
For information on Fall & Injury Prevention, visit: [email or web address] OR  [email or web address] OR  https://www.cdc.gov/steadi/patient.html

## 2023-10-16 NOTE — DISCHARGE NOTE PROVIDER - NSDCQMANTICOAGCONTRA_GEN_A_CORE
Patient does not have atrial fibrillation/flutter Patient does not have atrial fibrillation/flutter/Other reason...

## 2023-10-16 NOTE — DISCHARGE NOTE NURSING/CASE MANAGEMENT/SOCIAL WORK - NSDCPEEMAIL_GEN_ALL_CORE
[facility] for Tobacco Control email [email or web address]

## 2023-10-16 NOTE — DIETITIAN INITIAL EVALUATION ADULT - EDUCATION DIETARY MODIFICATIONS
(2) meets goals/outcomes Acitretin Counseling:  I discussed with the patient the risks of acitretin including but not limited to hair loss, dry lips/skin/eyes, liver damage, hyperlipidemia, depression/suicidal ideation, photosensitivity.  Serious rare side effects can include but are not limited to pancreatitis, pseudotumor cerebri, bony changes, clot formation/stroke/heart attack.  Patient understands that alcohol is contraindicated since it can result in liver toxicity and significantly prolong the elimination of the drug by many years.

## 2023-10-16 NOTE — DISCHARGE NOTE PROVIDER - NSDCCPCAREPLAN_GEN_ALL_CORE_FT
PRINCIPAL DISCHARGE DIAGNOSIS  Diagnosis: CVA (cerebrovascular accident)  Assessment and Plan of Treatment: you are found to have a new stroke on MRI. you are evauluated by stroke team who recommened medical management- start asprin daily and continue lipitor. continue physical therapy. follow with neurology in clinic as instructed      SECONDARY DISCHARGE DIAGNOSES  Diagnosis: Type 2 diabetes mellitus with hyperglycemia, without long-term current use of insulin  Assessment and Plan of Treatment: continue home meds    Diagnosis: HLD (hyperlipidemia)  Assessment and Plan of Treatment: continue home meds    Diagnosis: Chronic obstructive pulmonary disease (COPD)  Assessment and Plan of Treatment: continue home meds    Diagnosis: HTN (hypertension)  Assessment and Plan of Treatment: continue home meds

## 2023-10-16 NOTE — PROGRESS NOTE ADULT - ASSESSMENT
72-year-old right-handed male w/ PMHx HTN, HLD DM, stroke (10 years ago, residual R hemiparesis, ambulates w/ cane, not on antithrombotics), cavernoma (R frontal), COPD/emphysema, active smoker (55 pack year), chronic memory issues, p/w new +/- worsening of residual right sided weakness. Patient is a poor historian. Per wife, right face is at baseline; distal RUE is profoundly weaker than baseline; RLE is possibly weaker, but not clear. Per wife, stroke was thought to be secondary to bleed from cavernoma. However, worth noting that cavernoma on our CT head is on the R, and patient's symptoms are on the R. Otherwise negative general and neurological review of system. VS in ED: /65, HR 64, afebrile. Exam: R NLF, RUE paresis (distal >> proximal), RLE paresis (distal > proximal), clear Babinski on R. Pertinent labs: sodium 128, glucose 123, CBC wnl, coags wnl. CT head: subtle hyperdensity in R frontal subcortical along periventricular WM, chronic L corona radiata lacunar infarct.    mRS: 1  LKN: 13:00 PM 10/12/23  NIHSS: 2 (R NLF, mild RUE drift)    Not a tenecteplase or mechanical thrombectomy candidate due to presenting outside of acute treatment window.    Impression: Worsening of chronic right hemiparesis due to left posterior corona radiata infarct, likely due to  vs thrombosis of known cavernoma.     Recommendations:  [] Hyponatremia workup, as per primary team   [] Infectious workup, including chest XR and urinalysis  [] Continue with aspirin 81mg daily   [x] MRI brain w/wo contrast - results as noted above   [x] MRA brain without contrast; MRA neck with contrast , results as noted above   [x] TTE, results as noted above   [x] LDL: 67, HbA1c: 5.5  [] CBC, CMP, coagulation panel, troponin  [x] ASA 81 mg PO (325 per rectum if fails dysphagia)   [x] Atorvastatin 80mg (titrate to LDL < 70)   [x] Lovenox SQ for DVT prophylaxis   [x] Regular DASH diet  [] Keep BP permissive up to 220/110 for 24 hours followed by gradual normotension over 1-2 days  [] Telemonitoring  [] Neurological checks and vital signs per unit protocol  [] BGM goals 140-180  [x] PT/OT; rehab   [] Patient should follow up with Stroke NP, Angela Cleary or Samara Wong, in clinic at [address] [place] [address], [phone]. Please email [email or web address] w/ basic PHI.   [] From neurovascular standpoint patient cleared for discharge, neurology signing off.      Patient seen and discussed with stroke attending and team.      Please call [phone] with any questions.     72-year-old right-handed male w/ PMHx HTN, HLD DM, stroke (10 years ago, residual R hemiparesis, ambulates w/ cane, not on antithrombotics), cavernoma (R frontal), COPD/emphysema, active smoker (55 pack year), chronic memory issues, p/w new +/- worsening of residual right sided weakness. Patient is a poor historian. Per wife, right face is at baseline; distal RUE is profoundly weaker than baseline; RLE is possibly weaker, but not clear. Per wife, stroke was thought to be secondary to bleed from cavernoma. However, worth noting that cavernoma on our CT head is on the R, and patient's symptoms are on the R. Otherwise negative general and neurological review of system. VS in ED: /65, HR 64, afebrile. Exam: R NLF, RUE paresis (distal >> proximal), RLE paresis (distal > proximal), clear Babinski on R. Pertinent labs: sodium 128, glucose 123, CBC wnl, coags wnl. CT head: subtle hyperdensity in R frontal subcortical along periventricular WM, chronic L corona radiata lacunar infarct.    mRS: 1  LKN: 13:00 PM 10/12/23  NIHSS: 2 (R NLF, mild RUE drift)    Not a tenecteplase or mechanical thrombectomy candidate due to presenting outside of acute treatment window.    Impression: Worsening of chronic right hemiparesis due to left posterior corona radiata infarct, likely due to small vessel disease.    Recommendations:  [] Hyponatremia workup, as per primary team   [] Infectious workup, including chest XR and urinalysis  [] Continue with aspirin 81mg daily   [x] MRI brain w/wo contrast - results as noted above   [x] MRA brain without contrast; MRA neck with contrast , results as noted above   [x] TTE, results as noted above   [x] LDL: 67, HbA1c: 5.5  [] CBC, CMP, coagulation panel, troponin  [x] ASA 81 mg PO (325 per rectum if fails dysphagia)   [x] Atorvastatin 80mg (titrate to LDL < 70)   [x] Lovenox SQ for DVT prophylaxis   [x] Regular DASH diet  [] Keep BP permissive up to 220/110 for 24 hours followed by gradual normotension over 1-2 days  [] Telemonitoring  [] Neurological checks and vital signs per unit protocol  [] BGM goals 140-180  [x] PT/OT; rehab   [] Patient should follow up with Stroke NP, Angela Cleary or Samara Wong, in clinic at [address] [place] [address], [phone]. Please email [email or web address] w/ basic PHI.   [] From neurovascular standpoint patient cleared for discharge, neurology signing off.      Patient seen and discussed with stroke attending and team.      Please call [phone] with any questions.

## 2023-10-16 NOTE — DISCHARGE NOTE NURSING/CASE MANAGEMENT/SOCIAL WORK - PATIENT PORTAL LINK FT
You can access the FollowMyHealth Patient Portal offered by [facility] by registering at the following website: [email or web address]. By joining [employer]’s FollowMyHealth portal, you will also be able to view your health information using other applications (apps) compatible with our system.

## 2023-10-16 NOTE — DISCHARGE NOTE PROVIDER - NSDCFUSCHEDAPPT_GEN_ALL_CORE_FT
Giovani Boyd  [facility]  [address]  Scheduled Appointment: 01/11/2024

## 2023-10-16 NOTE — DISCHARGE NOTE PROVIDER - HOSPITAL COURSE
72M with PMHx HTN, HLD, DM2, CVA with R sided weakness 10 years ago, COPD presenting with worsening  right sided weakness x1 day. Pt had MRI brain whiched 1.0 x 0.5 cm focus of restricted diffusion within the left posterior corona radiata, compatible with an acute lacunar infarct.  MRA Head/neck no significant findings. Echo showed normal LVEF. Pt was evaluated by stroke team, who recommended asprin and continue home statin, as well as close follow up as outpt. Physical therapy rec [facility] but pt and family declined. Pt was discharged home with home services.

## 2023-10-16 NOTE — PROGRESS NOTE ADULT - SUBJECTIVE AND OBJECTIVE BOX
SUBJECTIVE: Right hand doing better.     INTERVAL HISTORY:  MRI done.  Patient seen and examined at bedside with stroke attending and team.     PAST MEDICAL & SURGICAL HISTORY:  Hypertension  Type 2 diabetes mellitus  Stroke  Right sided weakness  HLD (hyperlipidemia)  COPD (chronic obstructive pulmonary disease)  No significant past surgical history    FAMILY HISTORY:  No pertinent family history in first degree relatives    SOCIAL HISTORY:   T/E/D:   Occupation:   Lives with:     MEDICATIONS (HOME):  Home Medications:  atorvastatin 40 mg oral tablet: 1 tab(s) orally once a day (14 Oct 2023 02:41)  folic acid 1 mg oral tablet: 1 tab(s) orally once a day (14 Oct 2023 02:40)  lisinopril 20 mg oral tablet: 1 tab(s) orally once a day (14 Oct 2023 02:42)  metFORMIN 850 mg oral tablet: 1 tab(s) orally once a day (14 Oct 2023 02:41)  metoprolol tartrate 50 mg oral tablet: 1 tab(s) orally 2 times a day (14 Oct 2023 02:42)  pulmicort:  (14 Oct 2023 02:43)  trelegy:  (14 Oct 2023 02:42)    MEDICATIONS  (STANDING):  aspirin  chewable 81 milliGRAM(s) Oral daily  atorvastatin 40 milliGRAM(s) Oral at bedtime  budesonide 160 MICROgram(s)/formoterol 4.5 MICROgram(s) Inhaler 2 Puff(s) Inhalation two times a day  dextrose 5%. 1000 milliLiter(s) (50 mL/Hr) IV Continuous <Continuous>  dextrose 5%. 1000 milliLiter(s) (100 mL/Hr) IV Continuous <Continuous>  dextrose 50% Injectable 25 Gram(s) IV Push once  dextrose 50% Injectable 25 Gram(s) IV Push once  dextrose 50% Injectable 12.5 Gram(s) IV Push once  enoxaparin Injectable 40 milliGRAM(s) SubCutaneous every 24 hours  folic acid 1 milliGRAM(s) Oral daily  glucagon  Injectable 1 milliGRAM(s) IntraMuscular once  insulin lispro (ADMELOG) corrective regimen sliding scale   SubCutaneous three times a day before meals  insulin lispro (ADMELOG) corrective regimen sliding scale   SubCutaneous at bedtime  lisinopril 20 milliGRAM(s) Oral daily  metoprolol tartrate 50 milliGRAM(s) Oral two times a day  potassium chloride    Tablet ER 20 milliEquivalent(s) Oral once    MEDICATIONS  (PRN):  albuterol    90 MICROgram(s) HFA Inhaler 2 Puff(s) Inhalation every 6 hours PRN Shortness of Breath and/or Wheezing  dextrose Oral Gel 15 Gram(s) Oral once PRN Blood Glucose LESS THAN 70 milliGRAM(s)/deciliter    ALLERGIES/INTOLERANCES:  Allergies  penicillin (Hives)    Intolerances    VITALS & EXAMINATION:  Vital Signs Last 24 Hrs  T(C): 36.4 (16 Oct 2023 05:04), Max: 36.7 (15 Oct 2023 18:10)  T(F): 97.5 (16 Oct 2023 05:04), Max: 98.1 (16 Oct 2023 02:10)  HR: 66 (16 Oct 2023 05:04) (60 - 77)  BP: 113/68 (16 Oct 2023 05:04) (111/75 - 138/60)  RR: 18 (16 Oct 2023 05:04) (17 - 18)  SpO2: 97% (16 Oct 2023 05:04) (95% - 97%)    Parameters below as of 16 Oct 2023 05:04  Patient On (Oxygen Delivery Method): room air    General:  Constitutional: Male, appears stated age, in no apparent distress including pain  Head: Normocephalic & Atraumatic.  Respiratory: Breathing comfortably.  Extremities: No cyanosis, clubbing, or edema    Neurological:  MS: Eyes open, awake, alert, oriented to person, place, situation, time.  Follows simple commands.  Language: Speech is clear, fluent with good repetition & comprehension.  CNs: PERRL. VFF. EOMI no nystagmus. V1-3 intact to LT b/l. Mild R NLF flattening, full eye closure strength b/l. Hearing grossly normal (rubbing fingers) b/l. Tongue midline, normal movements, no atrophy.   Motor: Normal muscle bulk & tone. No noticeable tremor. RUE drift with wrist drop.  RLE, LUE, LLE no drifts.               Deltoid	Biceps	Triceps	  Wrist Ext   Finger Abduction   R	    5	   4+	     5	 4	  3		4- 	  L	    5	   5	     5	 5	  5		4+    	H-Flex	K-Ext	D-Flex	P-Flex  R	  5	   4+	   4+	   5		   L	  5	   5	   5	   5		     Sensation: Intact to LT b/l throughout.   Cortical: Extinction on DSS (neglect): none  Reflexes:              Biceps(C5)       BR(C6)                  Patellar(L4)    Achilles(S1)    Plantar Resp  R	      2+	          2	+                             2+	            2+		 Up  L	      2+	          2	+                             0	            2+		 Down     Coordination: Finger-nose-finger intact without ataxia or dysmetria on the L; testing limited on the R in the setting of weakness  Gait: Deferred.       LABORATORY:  CBC                       14.2   7.56  )-----------( 230      ( 16 Oct 2023 05:05 )             39.2     Chem 10-16    130<L>  |  95<L>  |  12  ----------------------------<  90  3.8   |  24  |  0.69    Ca    8.8      16 Oct 2023 05:05    TPro  6.7  /  Alb  3.9  /  TBili  0.9  /  DBili  x   /  AST  27  /  ALT  19  /  AlkPhos  70  10-16    LFTs LIVER FUNCTIONS - ( 16 Oct 2023 05:05 )  Alb: 3.9 g/dL / Pro: 6.7 g/dL / ALK PHOS: 70 U/L / ALT: 19 U/L / AST: 27 U/L / GGT: x           Coagulopathy   Lipid Panel 10-14 Chol 138 LDL -- HDL 64 Trig 37  A1c   Cardiac enzymes     U/A Urinalysis Basic - ( 16 Oct 2023 05:05 )    Color: x / Appearance: x / SG: x / pH: x  Gluc: 90 mg/dL / Ketone: x  / Bili: x / Urobili: x   Blood: x / Protein: x / Nitrite: x   Leuk Esterase: x / RBC: x / WBC x   Sq Epi: x / Non Sq Epi: x / Bacteria: x      Radiology (XR, CT, MR, U/S, TTE/LEONARDO):    MRI Head + MRA H/N:  MRI brain: 1.0 x 0.5 cm focus of restricted diffusion within the left   posterior corona radiata, compatible with an acute lacunar infarct.   Multiple additional chronic lacunar infarcts in the bilateral basal   ganglia and left thalamus are reidentified. Small focal chronic infarct   in the left frontal deep white matter is reidentified. 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 There is a developmental venous anomaly within the medial   right frontal lobe is reidentified. Multiple patchy and small foci of   T2/FLAIR hyperintense signal in the periventricular white matter,   compatible with mild to moderate chronic microvascular ischemic change.    MRA brain: No definite hemodynamically significant stenosis. There is   artifact through portions of the posterior cerebral arteries limiting   evaluation, however are patent distal to the areas of artifact.    Congenital anomalies as above.    MRA neck: No hemodynamically significant stenosis    CT BRAIN (10/13):   Subtle hyperdense focus in the left (typo) frontal subcortical white matter which has a linear hyperdense component associated with it possibly a DVA with cavernoma. Follow-up with contrast-enhanced brain MRI is recommended for further characterization. No definite acute intracranial hemorrhage vasogenic edema or midline shift. Moderate chronic microvascular changes and chronic left corona radiata lacunar infarct.    TTE:   1. Left ventricular cavity is normal. Left ventricular wall thickness is normal. Left ventricular systolic function is normal with an ejection fraction of 64 % by Green's method of disks. There are no regional wall motion abnormalities seen.   2. Normal right ventricular cavity size, wall thickness, and systolic function.   3. The aortic valve is tricuspid with normal leaflet excursion. There is no aortic valve stenosis. There is no evidence of aortic regurgitation.   4. Structurally normal mitral valve with normal leaflet excursion. There is calcification of the mitral valve annulus. There is no mitral valve stenosis. There is trace mitral regurgitation.   5. Normal atria.   6. No pericardial effusion seen SUBJECTIVE: Right hand doing better.     INTERVAL HISTORY:  MRI done.  Patient seen and examined at bedside with stroke attending and team.     PAST MEDICAL & SURGICAL HISTORY:  Hypertension  Type 2 diabetes mellitus  Stroke  Right sided weakness  HLD (hyperlipidemia)  COPD (chronic obstructive pulmonary disease)  No significant past surgical history    FAMILY HISTORY:  No pertinent family history in first degree relatives    SOCIAL HISTORY:   T/E/D:   Occupation:   Lives with:     MEDICATIONS (HOME):  Home Medications:  atorvastatin 40 mg oral tablet: 1 tab(s) orally once a day (14 Oct 2023 02:41)  folic acid 1 mg oral tablet: 1 tab(s) orally once a day (14 Oct 2023 02:40)  lisinopril 20 mg oral tablet: 1 tab(s) orally once a day (14 Oct 2023 02:42)  metFORMIN 850 mg oral tablet: 1 tab(s) orally once a day (14 Oct 2023 02:41)  metoprolol tartrate 50 mg oral tablet: 1 tab(s) orally 2 times a day (14 Oct 2023 02:42)  pulmicort:  (14 Oct 2023 02:43)  trelegy:  (14 Oct 2023 02:42)    MEDICATIONS  (STANDING):  aspirin  chewable 81 milliGRAM(s) Oral daily  atorvastatin 40 milliGRAM(s) Oral at bedtime  budesonide 160 MICROgram(s)/formoterol 4.5 MICROgram(s) Inhaler 2 Puff(s) Inhalation two times a day  dextrose 5%. 1000 milliLiter(s) (50 mL/Hr) IV Continuous <Continuous>  dextrose 5%. 1000 milliLiter(s) (100 mL/Hr) IV Continuous <Continuous>  dextrose 50% Injectable 25 Gram(s) IV Push once  dextrose 50% Injectable 25 Gram(s) IV Push once  dextrose 50% Injectable 12.5 Gram(s) IV Push once  enoxaparin Injectable 40 milliGRAM(s) SubCutaneous every 24 hours  folic acid 1 milliGRAM(s) Oral daily  glucagon  Injectable 1 milliGRAM(s) IntraMuscular once  insulin lispro (ADMELOG) corrective regimen sliding scale   SubCutaneous three times a day before meals  insulin lispro (ADMELOG) corrective regimen sliding scale   SubCutaneous at bedtime  lisinopril 20 milliGRAM(s) Oral daily  metoprolol tartrate 50 milliGRAM(s) Oral two times a day  potassium chloride    Tablet ER 20 milliEquivalent(s) Oral once    MEDICATIONS  (PRN):  albuterol    90 MICROgram(s) HFA Inhaler 2 Puff(s) Inhalation every 6 hours PRN Shortness of Breath and/or Wheezing  dextrose Oral Gel 15 Gram(s) Oral once PRN Blood Glucose LESS THAN 70 milliGRAM(s)/deciliter    ALLERGIES/INTOLERANCES:  Allergies  penicillin (Hives)    Intolerances    VITALS & EXAMINATION:  Vital Signs Last 24 Hrs  T(C): 36.4 (16 Oct 2023 05:04), Max: 36.7 (15 Oct 2023 18:10)  T(F): 97.5 (16 Oct 2023 05:04), Max: 98.1 (16 Oct 2023 02:10)  HR: 66 (16 Oct 2023 05:04) (60 - 77)  BP: 113/68 (16 Oct 2023 05:04) (111/75 - 138/60)  RR: 18 (16 Oct 2023 05:04) (17 - 18)  SpO2: 97% (16 Oct 2023 05:04) (95% - 97%)    Parameters below as of 16 Oct 2023 05:04  Patient On (Oxygen Delivery Method): room air    General:  Constitutional: Male, appears stated age, in no apparent distress including pain  Head: Normocephalic & Atraumatic.  Respiratory: Breathing comfortably.  Extremities: No cyanosis, clubbing, or edema    Neurological:  MS: Eyes open, awake, alert, oriented to person, place, situation, time.  Follows simple commands.  Language: Speech is clear, fluent with good repetition & comprehension.  CNs: PERRL. VFF. EOMI no nystagmus. V1-3 intact to LT b/l. Mild R NLF flattening, full eye closure strength b/l. Hearing grossly normal (rubbing fingers) b/l. Tongue midline, normal movements, no atrophy.   Motor: Normal muscle bulk & tone. No noticeable tremor. RUE drift with wrist drop.  RLE, LUE, LLE no drifts.               Deltoid	Biceps	Triceps	  Wrist Ext   Finger Abduction   R	    5	   4+	     5	 4	  3		3 	  L	    5	   5	     5	 5	  5		4+    	H-Flex	K-Ext	D-Flex	P-Flex  R	  5	   4+	   4+	   5		   L	  5	   5	   5	   5		     Sensation: Intact to LT b/l throughout.   Cortical: Extinction on DSS (neglect): none  Reflexes:              Biceps(C5)       BR(C6)                  Patellar(L4)    Achilles(S1)    Plantar Resp  R	      2+	          2	+                             2+	            2+		 Up  L	      2+	          2	+                             0	            2+		 Down     Coordination: Finger-nose-finger intact without ataxia or dysmetria on the L; testing limited on the R in the setting of weakness  Gait: Deferred.       LABORATORY:  CBC                       14.2   7.56  )-----------( 230      ( 16 Oct 2023 05:05 )             39.2     Chem 10-16    130<L>  |  95<L>  |  12  ----------------------------<  90  3.8   |  24  |  0.69    Ca    8.8      16 Oct 2023 05:05    TPro  6.7  /  Alb  3.9  /  TBili  0.9  /  DBili  x   /  AST  27  /  ALT  19  /  AlkPhos  70  10-16    LFTs LIVER FUNCTIONS - ( 16 Oct 2023 05:05 )  Alb: 3.9 g/dL / Pro: 6.7 g/dL / ALK PHOS: 70 U/L / ALT: 19 U/L / AST: 27 U/L / GGT: x           Coagulopathy   Lipid Panel 10-14 Chol 138 LDL -- HDL 64 Trig 37  A1c   Cardiac enzymes     U/A Urinalysis Basic - ( 16 Oct 2023 05:05 )    Color: x / Appearance: x / SG: x / pH: x  Gluc: 90 mg/dL / Ketone: x  / Bili: x / Urobili: x   Blood: x / Protein: x / Nitrite: x   Leuk Esterase: x / RBC: x / WBC x   Sq Epi: x / Non Sq Epi: x / Bacteria: x      Radiology (XR, CT, MR, U/S, TTE/LEONARDO):    MRI Head + MRA H/N:  MRI brain: 1.0 x 0.5 cm focus of restricted diffusion within the left   posterior corona radiata, compatible with an acute lacunar infarct.   Multiple additional chronic lacunar infarcts in the bilateral basal   ganglia and left thalamus are reidentified. Small focal chronic infarct   in the left frontal deep white matter is reidentified. 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 There is a developmental venous anomaly within the medial   right frontal lobe is reidentified. Multiple patchy and small foci of   T2/FLAIR hyperintense signal in the periventricular white matter,   compatible with mild to moderate chronic microvascular ischemic change.    MRA brain: No definite hemodynamically significant stenosis. There is   artifact through portions of the posterior cerebral arteries limiting   evaluation, however are patent distal to the areas of artifact.    Congenital anomalies as above.    MRA neck: No hemodynamically significant stenosis    CT BRAIN (10/13):   Subtle hyperdense focus in the left (typo) frontal subcortical white matter which has a linear hyperdense component associated with it possibly a DVA with cavernoma. Follow-up with contrast-enhanced brain MRI is recommended for further characterization. No definite acute intracranial hemorrhage vasogenic edema or midline shift. Moderate chronic microvascular changes and chronic left corona radiata lacunar infarct.    TTE:   1. Left ventricular cavity is normal. Left ventricular wall thickness is normal. Left ventricular systolic function is normal with an ejection fraction of 64 % by Green's method of disks. There are no regional wall motion abnormalities seen.   2. Normal right ventricular cavity size, wall thickness, and systolic function.   3. The aortic valve is tricuspid with normal leaflet excursion. There is no aortic valve stenosis. There is no evidence of aortic regurgitation.   4. Structurally normal mitral valve with normal leaflet excursion. There is calcification of the mitral valve annulus. There is no mitral valve stenosis. There is trace mitral regurgitation.   5. Normal atria.   6. No pericardial effusion seen

## 2023-10-16 NOTE — DIETITIAN INITIAL EVALUATION ADULT - PERTINENT LABORATORY DATA
10-16    130<L>  |  95<L>  |  12  ----------------------------<  90  3.8   |  24  |  0.69    Ca    8.8      16 Oct 2023 05:05    TPro  6.7  /  Alb  3.9  /  TBili  0.9  /  DBili  x   /  AST  27  /  ALT  19  /  AlkPhos  70  10-16  POCT Blood Glucose.: 112 mg/dL (10-16-23 @ 09:22)  A1C with Estimated Average Glucose Result: 5.5 % (10-14-23 @ 05:18)

## 2023-10-16 NOTE — DIETITIAN INITIAL EVALUATION ADULT - OTHER INFO
72M with PMHx HTN, HLD, DM2, CVA with R sided weakness 10 years ago, COPD presenting with right sided weakness x1 day. Admitted for stroke workup    Pt seen and reports good appetite ~75% intake of meals. No GI distress (nausea/vomiting/diarrhea/constipation.) at present. UBW- 240# 1 year ago per pt. Current wt- 173.2# (10/13/23). Noted intentional wt. loss of 65#/ 27% in one year. Labs reviewed A1c- 5.5% well controlled. Pt follows CHO consistent diet. pta.

## 2023-10-16 NOTE — DIETITIAN INITIAL EVALUATION ADULT - NS FNS DIET ORDER
Diet, DASH/TLC:   Sodium & Cholesterol Restricted  Consistent Carbohydrate {Evening Snack} (CSTCHOSN)  1500mL Fluid Restriction ([id number]) (10-15-23 @ 11:47)

## 2023-10-16 NOTE — DIETITIAN INITIAL EVALUATION ADULT - PERTINENT MEDS FT
MEDICATIONS  (STANDING):  aspirin  chewable 81 milliGRAM(s) Oral daily  atorvastatin 40 milliGRAM(s) Oral at bedtime  budesonide 160 MICROgram(s)/formoterol 4.5 MICROgram(s) Inhaler 2 Puff(s) Inhalation two times a day  dextrose 5%. 1000 milliLiter(s) (50 mL/Hr) IV Continuous <Continuous>  dextrose 5%. 1000 milliLiter(s) (100 mL/Hr) IV Continuous <Continuous>  dextrose 50% Injectable 25 Gram(s) IV Push once  dextrose 50% Injectable 25 Gram(s) IV Push once  dextrose 50% Injectable 12.5 Gram(s) IV Push once  enoxaparin Injectable 40 milliGRAM(s) SubCutaneous every 24 hours  folic acid 1 milliGRAM(s) Oral daily  glucagon  Injectable 1 milliGRAM(s) IntraMuscular once  insulin lispro (ADMELOG) corrective regimen sliding scale   SubCutaneous three times a day before meals  insulin lispro (ADMELOG) corrective regimen sliding scale   SubCutaneous at bedtime  lisinopril 20 milliGRAM(s) Oral daily  metoprolol tartrate 50 milliGRAM(s) Oral two times a day    MEDICATIONS  (PRN):  albuterol    90 MICROgram(s) HFA Inhaler 2 Puff(s) Inhalation every 6 hours PRN Shortness of Breath and/or Wheezing  dextrose Oral Gel 15 Gram(s) Oral once PRN Blood Glucose LESS THAN 70 milliGRAM(s)/deciliter

## 2023-10-16 NOTE — DISCHARGE NOTE NURSING/CASE MANAGEMENT/SOCIAL WORK - NSDCPEWEB_GEN_ALL_CORE
[facility] website --- [email or web address] --- [email or web address]

## 2023-10-16 NOTE — DIETITIAN INITIAL EVALUATION ADULT - ORAL INTAKE PTA/DIET HISTORY
Pt reports good appetite, follows CHO consistent diet and had intentional wt. loss of 65#/ 27% in one year.

## 2023-10-16 NOTE — DISCHARGE NOTE PROVIDER - NSDCMRMEDTOKEN_GEN_ALL_CORE_FT
atorvastatin 40 mg oral tablet: 1 tab(s) orally once a day  folic acid 1 mg oral tablet: 1 tab(s) orally once a day  lisinopril 20 mg oral tablet: 1 tab(s) orally once a day  metFORMIN 850 mg oral tablet: 1 tab(s) orally once a day  metoprolol tartrate 50 mg oral tablet: 1 tab(s) orally 2 times a day  pulmicort:   trelegy:    aspirin 81 mg oral tablet, chewable: 1 tab(s) orally once a day  atorvastatin 40 mg oral tablet: 1 tab(s) orally once a day  folic acid 1 mg oral tablet: 1 tab(s) orally once a day  lisinopril 20 mg oral tablet: 1 tab(s) orally once a day  metFORMIN 850 mg oral tablet: 1 tab(s) orally once a day  metoprolol tartrate 50 mg oral tablet: 1 tab(s) orally 2 times a day   aspirin 81 mg oral tablet, chewable: 1 tab(s) orally once a day  atorvastatin 40 mg oral tablet: 1 tab(s) orally once a day  folic acid 1 mg oral tablet: 1 tab(s) orally once a day  lisinopril 20 mg oral tablet: 1 tab(s) orally once a day  metFORMIN 850 mg oral tablet: 1 tab(s) orally once a day  metoprolol tartrate 50 mg oral tablet: 1 tab(s) orally 2 times a day  Pulmicort Flexhaler 180 mcg/inh inhalation powder: 2 puff(s) inhaled 2 times a day  Trelegy Ellipta 200 mcg-62.5 mcg-25 mcg/inh inhalation powder: 1 puff(s) inhaled once a day   aspirin 81 mg oral tablet, chewable: 1 tab(s) orally once a day  atorvastatin 40 mg oral tablet: 1 tab(s) orally once a day  folic acid 1 mg oral tablet: 1 tab(s) orally once a day  lisinopril 20 mg oral tablet: 1 tab(s) orally once a day  metFORMIN 850 mg oral tablet: 1 tab(s) orally once a day  metoprolol tartrate 50 mg oral tablet: 1 tab(s) orally 2 times a day  nicotine 14 mg/24 hr transdermal film, extended release: 1 patch transdermally once a day  Pulmicort Flexhaler 180 mcg/inh inhalation powder: 2 puff(s) inhaled 2 times a day  Trelegy Ellipta 200 mcg-62.5 mcg-25 mcg/inh inhalation powder: 1 puff(s) inhaled once a day

## 2023-10-16 NOTE — DISCHARGE NOTE PROVIDER - CARE PROVIDER_API CALL
Angela Cleary  NP in Family Health  [address] [place] [address], [address]  [place] [address]  Phone: [phone]  Fax: [phone]  Follow Up Time:    Angela Cleary  NP in Family Health  [address], [address]  [place] [address]  Phone: [phone]  Fax: [phone]  Follow Up Time:     Dr. Chris Ruiz,   follow up in 1-2 weeks as needed  Phone: (   )    -  Fax: (   )    -  Follow Up Time:

## 2023-10-16 NOTE — PROGRESS NOTE ADULT - ATTENDING COMMENTS
I have evaluated patient with YUE Venegas and stroke team.     MR results reviewed which show ischemic infarction at the left corona radiata area compatible with lacunar stroke probably related to small vessel disease. No further work up granted. I review with patient the risks vs benefits of Aspirin therapy for secondary stroke prevention measures. I explained to him that even though there is an estimated risk of bleeding from a subcortical cavernoma that is approximately 2-3% per year his lesions appear stable since 2013 MR of the brain in the system and aspirin does not increase risk of hemorrhage in general, and benefits likely outweighs any small theoretical increase in cavernoma hemorrhage risk.

## 2023-10-31 PROBLEM — I10 ESSENTIAL (PRIMARY) HYPERTENSION: Chronic | Status: ACTIVE | Noted: 2023-10-14

## 2023-10-31 PROBLEM — R53.1 WEAKNESS: Chronic | Status: ACTIVE | Noted: 2023-10-14

## 2023-10-31 PROBLEM — E11.9 TYPE 2 DIABETES MELLITUS WITHOUT COMPLICATIONS: Chronic | Status: ACTIVE | Noted: 2023-10-14

## 2023-10-31 PROBLEM — J44.9 CHRONIC OBSTRUCTIVE PULMONARY DISEASE, UNSPECIFIED: Chronic | Status: ACTIVE | Noted: 2023-10-14

## 2023-10-31 PROBLEM — E78.5 HYPERLIPIDEMIA, UNSPECIFIED: Chronic | Status: ACTIVE | Noted: 2023-10-14

## 2023-10-31 PROBLEM — I63.9 CEREBRAL INFARCTION, UNSPECIFIED: Chronic | Status: ACTIVE | Noted: 2023-10-14

## 2023-11-20 ENCOUNTER — NON-APPOINTMENT (OUTPATIENT)
Age: 73
End: 2023-11-20

## 2023-11-28 ENCOUNTER — APPOINTMENT (OUTPATIENT)
Age: 73
End: 2023-11-28
Payer: MEDICARE

## 2023-11-28 ENCOUNTER — NON-APPOINTMENT (OUTPATIENT)
Age: 73
End: 2023-11-28

## 2023-11-28 VITALS
BODY MASS INDEX: 30.16 KG/M2 | HEIGHT: 65 IN | SYSTOLIC BLOOD PRESSURE: 150 MMHG | HEART RATE: 57 BPM | WEIGHT: 181 LBS | DIASTOLIC BLOOD PRESSURE: 78 MMHG

## 2023-11-28 DIAGNOSIS — I63.81 OTHER CEREBRAL INFARCTION DUE TO OCCLUSION OR STENOSIS OF SMALL ARTERY: ICD-10-CM

## 2023-11-28 DIAGNOSIS — D18.00 HEMANGIOMA UNSPECIFIED SITE: ICD-10-CM

## 2023-11-28 PROCEDURE — 99204 OFFICE O/P NEW MOD 45 MIN: CPT

## 2023-12-25 ENCOUNTER — NON-APPOINTMENT (OUTPATIENT)
Age: 73
End: 2023-12-25

## 2023-12-27 RX ORDER — POLYETHYLENE GLYCOL 3350 AND ELECTROLYTES WITH LEMON FLAVOR 236; 22.74; 6.74; 5.86; 2.97 G/4L; G/4L; G/4L; G/4L; G/4L
236 POWDER, FOR SOLUTION ORAL
Qty: 1 | Refills: 0 | Status: ACTIVE | COMMUNITY
Start: 2023-12-27 | End: 1900-01-01

## 2024-01-11 ENCOUNTER — APPOINTMENT (OUTPATIENT)
Dept: GASTROENTEROLOGY | Facility: HOSPITAL | Age: 74
End: 2024-01-11

## 2024-01-25 ENCOUNTER — APPOINTMENT (OUTPATIENT)
Dept: PULMONOLOGY | Facility: CLINIC | Age: 74
End: 2024-01-25
Payer: MEDICARE

## 2024-01-25 VITALS
SYSTOLIC BLOOD PRESSURE: 114 MMHG | WEIGHT: 181 LBS | DIASTOLIC BLOOD PRESSURE: 60 MMHG | TEMPERATURE: 97.8 F | OXYGEN SATURATION: 93 % | BODY MASS INDEX: 30.16 KG/M2 | HEART RATE: 64 BPM | RESPIRATION RATE: 16 BRPM | HEIGHT: 65 IN

## 2024-01-25 PROCEDURE — 95012 NITRIC OXIDE EXP GAS DETER: CPT

## 2024-01-25 PROCEDURE — 99214 OFFICE O/P EST MOD 30 MIN: CPT | Mod: 25

## 2024-01-25 PROCEDURE — 94060 EVALUATION OF WHEEZING: CPT

## 2024-01-25 RX ORDER — FLUTICASONE FUROATE, UMECLIDINIUM BROMIDE AND VILANTEROL TRIFENATATE 200; 62.5; 25 UG/1; UG/1; UG/1
200-62.5-25 POWDER RESPIRATORY (INHALATION)
Qty: 3 | Refills: 1 | Status: ACTIVE | COMMUNITY
Start: 2023-04-11 | End: 1900-01-01

## 2024-01-26 DIAGNOSIS — U07.1 COVID-19: ICD-10-CM

## 2024-01-26 RX ORDER — NIRMATRELVIR AND RITONAVIR 300-100 MG
20 X 150 MG & KIT ORAL
Qty: 1 | Refills: 0 | Status: ACTIVE | COMMUNITY
Start: 2024-01-26 | End: 1900-01-01

## 2024-03-21 ENCOUNTER — APPOINTMENT (OUTPATIENT)
Dept: NEUROLOGY | Facility: CLINIC | Age: 74
End: 2024-03-21

## 2024-03-27 ENCOUNTER — APPOINTMENT (OUTPATIENT)
Dept: THORACIC SURGERY | Facility: CLINIC | Age: 74
End: 2024-03-27
Payer: MEDICARE

## 2024-03-27 ENCOUNTER — NON-APPOINTMENT (OUTPATIENT)
Age: 74
End: 2024-03-27

## 2024-03-27 VITALS
OXYGEN SATURATION: 90 % | WEIGHT: 175 LBS | BODY MASS INDEX: 29.16 KG/M2 | HEIGHT: 65 IN | DIASTOLIC BLOOD PRESSURE: 75 MMHG | SYSTOLIC BLOOD PRESSURE: 173 MMHG | RESPIRATION RATE: 16 BRPM | HEART RATE: 73 BPM

## 2024-03-27 DIAGNOSIS — R91.1 SOLITARY PULMONARY NODULE: ICD-10-CM

## 2024-03-27 PROCEDURE — 99213 OFFICE O/P EST LOW 20 MIN: CPT

## 2024-03-27 NOTE — PHYSICAL EXAM
[Auscultation Breath Sounds / Voice Sounds] : lungs were clear to auscultation bilaterally [] : no respiratory distress [Heart Rate And Rhythm] : heart rate was normal and rhythm regular [Examination Of The Chest] : the chest was normal in appearance [Chest Visual Inspection Thoracic Asymmetry] : no chest asymmetry [Diminished Respiratory Excursion] : normal chest expansion

## 2024-03-28 NOTE — ASSESSMENT
[FreeTextEntry1] : Mr. SENTHIL SANCHEZ, 73 year old male, current smoker, w/ hx of COPD, HLD, HTN, CVA (2013) with right sided numbness, lung nodule. Recent CT revealing enlarging RLL nodule. Referred by Dr. Steven Renee (pulm).  09/20/2023: CT from previous years, May 2023 and Aug 2023 reviewed and compared. In Aug 2023 scan, the BLANCA nodule is actually less dense compared with May 2023 scan. There is no discrete RLL nodule, or maybe it is in RML, looks stable in size and density, I would like to repeat the CT Chest in 6 mons.   He presents today for 6 months follow up with imaging.  I have independently reviewed the medical records and imaging at the time of this office consultation. CT chest reviewed and discussed with patient, stable nodules. Return to clinic in 18mons with CT chest w/o contrast to re-evaluate. F/u with Dr. Renee (pulmonology) for cough and SOB.   Recommendations reviewed with patient during this office visit, and all questions answered; Patient instructed on the importance of follow up and verbalizes understanding.   I, GUNNAR Parar, personally performed the evaluation and management (E/M) services for this established patient who presents today with (a) new problem(s)/exacerbation of (an) existing condition(s).  That E/M includes conducting the examination, assessing all new/exacerbated conditions, and establishing a new plan of care.  Today, my ACP, Jose G Mota/Jeannette Garces, SCAR-BC, were here to observe my evaluation and management services for this new problem/exacerbated condition to be followed going forward.

## 2024-03-28 NOTE — CONSULT LETTER
[FreeTextEntry2] : Dr. Steven Renee (pulm/ref) [FreeTextEntry3] : Daren Solis MD, FACS , Division of Thoracic Surgery [facility] Thoracic Surgery [facility] Department of Cardiovascular & Thoracic Surgery  [employer] [facility]

## 2024-03-28 NOTE — HISTORY OF PRESENT ILLNESS
[FreeTextEntry1] : Mr. SENTHIL SANCHEZ, 73 year old male, current smoker, w/ hx of COPD, HLD, HTN, CVA (2013) with right sided numbness, lung nodule. Recent CT revealing enlarging RLL nodule. Referred by Dr. Steven Renee (pulm).  Last seen in office in 09/2016: RLL likely inflammation vs infection. Continue follow up with Dr. Renee, RTC as needed  PFTs on 04/11/2023: FVC 3.10, 99%; FEV1 1.27, 51%; DLCO 11.2, 56%  CT Chest on 05/25/2023 ([facility]): - 11 x 9 mm predominantly groundglass nodule (10:164), with central lucency, is increased in density from 2021, only faintly identified as minimal groundglass opacity appromimately 10 x 8 mm (6:162) from 03/03/2021. There is a new solid complement measuring approximately 5 x 2 mm (10:164). - Unchanged 3 mm faint nodue left apex (10:57). - Unchanged 4 mm BLANCA (10:115) - Unchanged 5 mm RML subpleural nodule (10:380).  - Emphysema: Moderate upper lobe predominany centrilobular and paraseptal emphysema. - Mild lower lobe bronchial wall thickening.  CT Chest on 08/29/2023 ([facility]): - 11 X 10 mm BLANCA predominately groundglass nodule with central lucency (10:136). There is a 4 x 2 mm solid components on (10:138) not significantly changed. The nodule was 11 x 9 mm on (10:164) from 05/25/2023. - 7 x 5 mm nodule RLL (10:263), previously 4 x 3 mm on (10, 285) on 05/25/2023.  - 3 mm left upper apex (10:33). - 4 mm granuloma BLANCA (10:91), unchanged. - 5 mm subpleural nodule RML (10:287), unchanged. - There is groundglass opacity at the periphery of the left lung base similar to the prior study. - Emphysema. - Moderate upper lobe centrilobular and paraseptal emphysema.   09/20/2023: CT from previous years, May 2023 and Aug 2023 reviewed and compared. In Aug 2023 scan, the BLANCA nodule is actually less dense compared with May 2023 scan. There is no discrete RLL nodule, or maybe it is in RML, looks stable in size and density, I would like to repeat the CT Chest in 6 mons.   Spirometry on 01/25/2024: FVC 2.32, 65%; FEV1 0.76, 29.4%  CT Chest on 03/14/2024 ([facility]): - Moderate centrilobular and mild paraseptal emphysema.  - Subpleural reticulation and cyst formation primarily within the bilateral lower lobes greater on the left.  - Stable 5 mm pleural-based nodule in the right middle lobe, lateral segment (series 6, image 302). - Additional sub-4 mm nodules are annotated on series 6.  - Left upper lobe granuloma. - Moderate diffuse airway thickening.  - Mild right lower lobe predominant mucoid impaction.    Patient is here today for 6 months follow up. He reports of chronic cough and SOB, denies any CP.

## 2024-03-28 NOTE — DATA REVIEWED
[FreeTextEntry1] : I have independently reviewed the following: Spirometry on 01/25/2024 CT Chest on 03/14/2024

## 2024-04-25 RX ORDER — LEVALBUTEROL HYDROCHLORIDE 0.63 MG/3ML
0.63 SOLUTION RESPIRATORY (INHALATION)
Qty: 120 | Refills: 2 | Status: DISCONTINUED | COMMUNITY
Start: 2024-01-25 | End: 2024-04-25

## 2024-04-25 RX ORDER — IPRATROPIUM BROMIDE 0.5 MG/2.5ML
0.02 SOLUTION RESPIRATORY (INHALATION)
Qty: 360 | Refills: 0 | Status: ACTIVE | COMMUNITY
Start: 2024-04-25 | End: 1900-01-01

## 2024-04-25 RX ORDER — BUDESONIDE 180 UG/1
180 AEROSOL, POWDER RESPIRATORY (INHALATION)
Qty: 3 | Refills: 0 | Status: ACTIVE | COMMUNITY
Start: 2021-02-25 | End: 1900-01-01

## 2024-06-04 ENCOUNTER — APPOINTMENT (OUTPATIENT)
Dept: PULMONOLOGY | Facility: CLINIC | Age: 74
End: 2024-06-04
Payer: MEDICARE

## 2024-06-04 VITALS
HEART RATE: 71 BPM | DIASTOLIC BLOOD PRESSURE: 76 MMHG | OXYGEN SATURATION: 92 % | BODY MASS INDEX: 29.16 KG/M2 | RESPIRATION RATE: 16 BRPM | TEMPERATURE: 96.6 F | HEIGHT: 65 IN | WEIGHT: 175 LBS | SYSTOLIC BLOOD PRESSURE: 152 MMHG

## 2024-06-04 DIAGNOSIS — F17.200 NICOTINE DEPENDENCE, UNSPECIFIED, UNCOMPLICATED: ICD-10-CM

## 2024-06-04 DIAGNOSIS — E66.9 OBESITY, UNSPECIFIED: ICD-10-CM

## 2024-06-04 DIAGNOSIS — J44.1 CHRONIC OBSTRUCTIVE PULMONARY DISEASE WITH (ACUTE) EXACERBATION: ICD-10-CM

## 2024-06-04 DIAGNOSIS — R06.02 SHORTNESS OF BREATH: ICD-10-CM

## 2024-06-04 DIAGNOSIS — J44.9 CHRONIC OBSTRUCTIVE PULMONARY DISEASE, UNSPECIFIED: ICD-10-CM

## 2024-06-04 DIAGNOSIS — J31.0 CHRONIC RHINITIS: ICD-10-CM

## 2024-06-04 DIAGNOSIS — R93.89 ABNORMAL FINDINGS ON DIAGNOSTIC IMAGING OF OTHER SPECIFIED BODY STRUCTURES: ICD-10-CM

## 2024-06-04 PROCEDURE — 94727 GAS DIL/WSHOT DETER LNG VOL: CPT

## 2024-06-04 PROCEDURE — 95012 NITRIC OXIDE EXP GAS DETER: CPT

## 2024-06-04 PROCEDURE — 94010 BREATHING CAPACITY TEST: CPT

## 2024-06-04 PROCEDURE — ZZZZZ: CPT

## 2024-06-04 PROCEDURE — 99214 OFFICE O/P EST MOD 30 MIN: CPT | Mod: 25

## 2024-06-04 PROCEDURE — 94729 DIFFUSING CAPACITY: CPT

## 2024-06-04 NOTE — PHYSICAL EXAM
[No Acute Distress] : no acute distress [Normal Oropharynx] : normal oropharynx [III] : Mallampati Class: III [Normal Appearance] : normal appearance [No Neck Mass] : no neck mass [Normal Rate/Rhythm] : normal rate/rhythm [Normal S1, S2] : normal s1, s2 [No Murmurs] : no murmurs [No Resp Distress] : no resp distress [Clear to Auscultation Bilaterally] : clear to auscultation bilaterally [Kyphosis] : kyphosis [Benign] : benign [Normal Gait] : normal gait [No Clubbing] : no clubbing [No Cyanosis] : no cyanosis [No Edema] : no edema [FROM] : FROM [Normal Color/ Pigmentation] : normal color/ pigmentation [No Focal Deficits] : no focal deficits [Oriented x3] : oriented x3 [Normal Affect] : normal affect [TextBox_2] : ow  [TextBox_68] : I:E ratio 1:3; clear

## 2024-06-04 NOTE — PROCEDURE
[FreeTextEntry1] : Full PFT reveals severe obstructive dysfunction; FEV1 was 1.01L which is 39% of predicted; normal lung volumes; moderately reduced diffusion at 10.2, which is 53% of predicted; normal flow volume loop. PFTs were performed to evaluate for SOB  Chest CT (3/14/2024) revealed  1. Moderate centrilobular emphysema and airways inflammation 2. Stable pulmonary nodules measuring up to 5 mm. No new or enlarging pulmonary nodules. 3. No intrathoracic adenopathy.  FENO was 5; a normal value being less than 25 Fractional exhaled nitric oxide (FENO) is regarded as a simple, noninvasive method for assessing eosinophilic airway inflammation. Produced by a variety of cells within the lung, nitric oxide (NO) concentrations are generally low in healthy individuals. However, high concentrations of NO appear to be involved in nonspecific host defense mechanisms and chronic inflammatory diseases such as asthma. The American Thoracic Society (ATS) therefore has recommended using FENO to aid in the diagnosis and monitoring of eosinophilic airway inflammation and asthma, and for identifying steroid responsive individuals whose chronic respiratory symptoms may be caused by airway inflammation.

## 2024-06-04 NOTE — ADDENDUM
[FreeTextEntry1] : Documented by Jeannette Pool acting as a scribe for Dr. Steven Renee on 06/04/2024. All medical record entries made by the Scribe were at my, Dr. Steven Renee's, direction and personally dictated by me on 06/04/2024. I have reviewed the chart and agree that the record accurately reflects my personal performance of the history, physical exam, assessment and plan. I have also personally directed, reviewed, and agree with the discharge instructions.

## 2024-06-04 NOTE — HISTORY OF PRESENT ILLNESS
[FreeTextEntry1] : Mr. Ann is a 73-year-old male presenting to the office today for a follow-up pulmonary evaluation for abnormal chest CT, COPD, nicotine addiction, and non-allergic rhinitis. His chief complaint is   -he notes feeling generally well  -he notes s/p eval with Dr. Solis 3/2024. His next CT will be 18 months from then -he notes chronic back pain due to sciatica -he denies dysphonia  -he notes appetite is stable  -he denies taking any new medications, vitamins, or supplements  -he notes his cardiologist lowered his dose of metoprolol  -he notes he's now at 171 lbs, and he's happy with his weight -he notes vision is stable  -he notes balance is stable  -he notes smoking 7 cigarettes per day -he notes L leg pain, which prevents him from walking -he notes he has a nuclear stress scheduled for 7/2024 with Dr. Manuel Barone  -he denies any headaches, nausea, emesis, fever, chills, sweats, chest pain, chest pressure, coughing, wheezing, palpitations, diarrhea, constipation, dysphagia, vertigo, leg swelling, itchy eyes, itchy ears, heartburn, reflux, or sour taste in the mouth.

## 2024-06-04 NOTE — ASSESSMENT
[FreeTextEntry1] : Mr. Colón is a 73 year old male with a history of COPD, overweight, OSAS, allergy, CVA, HTN, and nicotine addiction- stable from a pulmonary perspective. (stable), poor sleep #1 (still), still smoking- purposeful weight loss; mild SOB (URI), abnormal CT (next 9/2025) as per Dr. Solis ([facility])  His shortness of breath is multifactorial due to: -COPD -obesity -poor breathing mechanics related to poor balance -potential underlying cardiac disease  problem 1: COPD (controlled) -Recommended the use of Aerobika to help bring up mucus -Trelegy 200 1 inhalation qD -continue to use Combivent as a rescue inhaler -continue Xopenex 0.63 via nebulizer q6H prn -COPD is a progressive disease and although it can't be cured , appropriate management can slow its progression, reduce frequency and severity of exacerbations, and improve symptoms and the patient quality of life. Hospitalizations are the greatest contributor to the total COPD costs and account for up to 87% of total COPD related costs. Exacerbations are the main cause of admissions and subsequently account for the 40-75% of COPD costs. Inhaled maintenance therapy reduces the incidence of exacerbations in patients with stable COPD. Incorrect inhaler use and nonadherence are major obstacles to achieving COPD treatment goals. Many COPD patients have challenges (impaired inhalation, limited dexterity, reduced cognition: that limit their ability to correctly use their COPD treatment devices resulting in reduced symptom control. Of most importance is smoking cessation and early intervention with respiratory illnesses and contemplation for pulmonary rehab to enhance quality of life. -Inhaler technique reviewed as well as oral hygiene techniques reviewed with patient. Avoidance of cold air, extremes of temperature, rescue inhaler should be used before exercise. Order of medication reviewed with patient. Recommended use of a cool mist humidifier in the bedroom.  Problem 1A: Cardiac -f/p discussed with patient- Dr. Lamont Barone  problem 2: allergies and sinuses (quiet) -continue Flonase 1 sniff each nostril BID -continue to use Olopatadine 1 sniff/nostril BID (.6) -Environmental measures for allergies were encouraged including mattress and pillow cover, air purifier, and environmental controls.  problem 3: nonallergic rhinitis: -continue Atrovent (0.6%) nasal spray 1 sniff each nostril up to TID  problem 4: history of abnormal chest CT / Lung Cancer Screening  -s/p eval with Dr. Pieter Solis -He should have a follow up low dose chest CT 9/2025  -Discussed with patient about the lung cancer screening study (with Dez Marion), and is being set up for the study CAT scans are the only radiological modality to identify abnormalities w/in the lungs with regards to nodules/masses/lymph nodes. Risks, benefits were reviewed in detail. The guidelines for abnormalities include follow up CT scans at various intervals which could range from 6 weeks to 1 year intervals. If there is a change for the worse then consideration for a biopsy will be considered if you are a candidate. Second opinion evaluation with a thoracic surgeon or an interventional radiologist could be offered.  problem 5: smoking and nicotine addiction (d/w pt 06/04/2024) -He is being prescribed Wellbutrin -Discussed the risks/associations with continued smoking including COPD, emphysema, shortness of breath, renal cancer, bladder cancer, stroke risk, cardiac disease, etc. Smoking cessation was discussed at length and highly encouraged. Various options to aid cessation was discussed including use of Chantix, Nicotrol, nicotine products, laser therapy, hypnosis, Wellbutrin, etc.  problem 6: obesity (in progress) -recommended Berberine OTC  -Weight loss, exercise, and diet control were discussed and are highly encouraged. Treatment options were given such as, aqua therapy, and contacting a nutritionist. Recommended to use the elliptical, stationary bike, less use of treadmill. Obesity is associated with worsening asthma, shortness of breath, and potential for cardiac disease, diabetes, and other underlying medical conditions.  problem 7: ? JESS (no interest) -Refused Rx/Dx -based on his fatigue, EDS, snoring, elevated mallampati class he is being set up for an at home sleep study -Sleep apnea is associated with adverse clinical consequences which an affect most organ systems. Cardiovascular disease risk includes arrhythmias, atrial fibrillation, hypertension, coronary artery disease, and stroke. Metabolic disorders include diabetes type 2, non-alcoholic fatty liver disease. Mood disorder especially depression; and cognitive decline especially in the elderly. Associations with chronic reflux/Cortés's esophagus some but not all inclusive. -Reasons to assess this include arousal consistent with hypopnea; respiratory events most prominent in REM sleep or supine position; therefore sleep staging and body position are important for accurate diagnosis and estimation of AHI.  problem 8: Health Maintenance/COVID19 Precautions: -recommended SaNOtize nasal spray -S/p Covid vaccine x3 - Clean your hands often. Wash your hands often with soap and water for at least 20 seconds, especially after blowing your nose, coughing, or sneezing, or having been in a public place. - If soap and water are not available, use a hand  that contains at least 60% alcohol - To the extent possible, avoid touching high-touch surfaces in public places - elevator buttons, door handles, handrails, handshaking with people, etc. Use a tissue or your sleeve to cover your hand or finger if you must touch something. - Wash your hands after touching surfaces in public places. - Avoid touching your face, nose, eyes, etc. - Clean and disinfect your home to remove germs: practice routine cleaning of frequently touched surfaces (for example: tables, doorknobs, light switches, handles, desks, toilets, faucets, sinks & cell phones) - Avoid crowds, especially in poorly ventilated spaces. Your risk of exposure to respiratory viruses like COVID-19 may increase in crowded, closed-in settings with little air circulation if there are people in the crowd who are sick. All patients are recommended to practice social distancing and stay at least 6 feet away from others. - Avoid all non-essential travel including plane trips, and especially avoid embarking on cruise ships. -If COVID-19 is spreading in your community, take extra measures to put distance between yourself and other people to further reduce your risk of being exposed to this new virus. -Stay home as much as possible. - Consider ways of getting food brought to your house through family, social, or commercial networks -Be aware that the virus has been known to live in the air up to 3 hours post exposure, cardboard up to 24 hours post exposure, copper up to 4 hours post exposure, steel and plastic up to 2-3 days post exposure. Risk of transmission from these surfaces are affected by many variables. Immune Support Recommendations:  -OTC Vitamin C 500mg BID -OTC Quercetin 250-500mg BID -OTC Zinc 75-100mg per day -OTC Melatonin 1 or 2 mg a night -OTC Vitamin D 1-4000mg per day -OTC Tonic Water 8oz per day  Asthma and COVID19: You need to make sure your asthma is under control. This often requires the use of inhaled corticosteroids (and sometimes oral corticosteroids). Inhaled corticosteroids do not likely reduce your immune system's ability to fight infections, but oral corticosteroids may. It is important to use the steps above to protect yourself to limit your exposure to any respiratory virus.  problem 9: health maintenance -recommended yearly flu shot after October 15 s/p 2023 -recommended strep pneumonia vaccines: Prevnar-13 vaccine, followed by Pneumo vaccine 23 one year following (2/2020) -recommended early intervention for URIs -recommended regular osteoporosis evaluations -recommended early dermatological evaluations -recommended after the age of 50 to the age of 70, colonoscopy every 5 years  He should follow up in 4-6 months. He is encouraged to call with any changes, concerns, or questions.

## 2024-10-29 ENCOUNTER — APPOINTMENT (OUTPATIENT)
Dept: PULMONOLOGY | Facility: CLINIC | Age: 74
End: 2024-10-29
Payer: MEDICARE

## 2024-10-29 VITALS
RESPIRATION RATE: 16 BRPM | HEART RATE: 61 BPM | BODY MASS INDEX: 29.66 KG/M2 | DIASTOLIC BLOOD PRESSURE: 80 MMHG | OXYGEN SATURATION: 90 % | WEIGHT: 178 LBS | HEIGHT: 65 IN | SYSTOLIC BLOOD PRESSURE: 120 MMHG | TEMPERATURE: 97.4 F

## 2024-10-29 DIAGNOSIS — F17.200 NICOTINE DEPENDENCE, UNSPECIFIED, UNCOMPLICATED: ICD-10-CM

## 2024-10-29 DIAGNOSIS — E66.9 OBESITY, UNSPECIFIED: ICD-10-CM

## 2024-10-29 DIAGNOSIS — J44.9 CHRONIC OBSTRUCTIVE PULMONARY DISEASE, UNSPECIFIED: ICD-10-CM

## 2024-10-29 DIAGNOSIS — R91.1 SOLITARY PULMONARY NODULE: ICD-10-CM

## 2024-10-29 DIAGNOSIS — R06.02 SHORTNESS OF BREATH: ICD-10-CM

## 2024-10-29 DIAGNOSIS — R93.89 ABNORMAL FINDINGS ON DIAGNOSTIC IMAGING OF OTHER SPECIFIED BODY STRUCTURES: ICD-10-CM

## 2024-10-29 PROCEDURE — 99214 OFFICE O/P EST MOD 30 MIN: CPT | Mod: 25

## 2024-10-29 PROCEDURE — 94010 BREATHING CAPACITY TEST: CPT

## 2024-10-29 PROCEDURE — 95012 NITRIC OXIDE EXP GAS DETER: CPT

## 2024-10-29 RX ORDER — DESLORATADINE 5 MG/1
5 TABLET ORAL
Qty: 90 | Refills: 1 | Status: ACTIVE | COMMUNITY
Start: 2024-10-29 | End: 1900-01-01

## 2025-03-25 ENCOUNTER — MED ADMIN CHARGE (OUTPATIENT)
Age: 75
End: 2025-03-25

## 2025-03-25 ENCOUNTER — APPOINTMENT (OUTPATIENT)
Dept: PULMONOLOGY | Facility: CLINIC | Age: 75
End: 2025-03-25
Payer: MEDICARE

## 2025-03-25 VITALS
BODY MASS INDEX: 31.89 KG/M2 | RESPIRATION RATE: 16 BRPM | TEMPERATURE: 97.8 F | HEART RATE: 69 BPM | SYSTOLIC BLOOD PRESSURE: 120 MMHG | DIASTOLIC BLOOD PRESSURE: 78 MMHG | WEIGHT: 180 LBS | HEIGHT: 63 IN | OXYGEN SATURATION: 90 %

## 2025-03-25 DIAGNOSIS — E66.9 OBESITY, UNSPECIFIED: ICD-10-CM

## 2025-03-25 DIAGNOSIS — F17.200 NICOTINE DEPENDENCE, UNSPECIFIED, UNCOMPLICATED: ICD-10-CM

## 2025-03-25 DIAGNOSIS — R06.02 SHORTNESS OF BREATH: ICD-10-CM

## 2025-03-25 DIAGNOSIS — R91.1 SOLITARY PULMONARY NODULE: ICD-10-CM

## 2025-03-25 DIAGNOSIS — U07.1 COVID-19: ICD-10-CM

## 2025-03-25 DIAGNOSIS — J44.9 CHRONIC OBSTRUCTIVE PULMONARY DISEASE, UNSPECIFIED: ICD-10-CM

## 2025-03-25 DIAGNOSIS — R93.89 ABNORMAL FINDINGS ON DIAGNOSTIC IMAGING OF OTHER SPECIFIED BODY STRUCTURES: ICD-10-CM

## 2025-03-25 PROCEDURE — 94727 GAS DIL/WSHOT DETER LNG VOL: CPT

## 2025-03-25 PROCEDURE — 99214 OFFICE O/P EST MOD 30 MIN: CPT | Mod: 25

## 2025-03-25 PROCEDURE — 94729 DIFFUSING CAPACITY: CPT

## 2025-03-25 PROCEDURE — ZZZZZ: CPT

## 2025-03-25 PROCEDURE — 99406 BEHAV CHNG SMOKING 3-10 MIN: CPT | Mod: 25

## 2025-03-25 PROCEDURE — 90677 PCV20 VACCINE IM: CPT

## 2025-03-25 PROCEDURE — 94010 BREATHING CAPACITY TEST: CPT

## 2025-03-25 PROCEDURE — G0009: CPT

## 2025-04-30 NOTE — PHYSICAL THERAPY INITIAL EVALUATION ADULT - THERAPY FREQUENCY, PT EVAL
"Medial Branch Blocks     What are the facet joints?     The facet joints are the articulations or connections between the vertebrae in the spine.  They are like any other joint in the body like the knee or elbow that enable the bending or twisting movements of the spine.  The facet joints help support the weight and control movement between individual vertebrae.    The facet joints can get inflamed secondary to injury or arthritis and cause pain and stiffness.  To help determine whether the facet joints are a source of pain, it is necessary to perform a nerve block along the nerves that supply sensation to your joints.  Facet joints are innervated or \"supplied\" by nerves called medial branches.  These nerves carried the pain signals to the spinal cord and the signals eventually reach the brain, where the pain is noticed.    If the nerves were \"blocked\" or \"numbed\", they will not be able to carry pain sensation to the spinal cord for the short term.  Therefore, if the pain is due to facet joint arthritis, you should have relief from pain and stiffness.  This is a diagnostic procedure. You will be given a pain diary to take home for the next 24 hours.  If this does provide short-term relief, this procedure may be repeated to confirm diagnosis.  Once it is determined that the pain is indeed due to the facet joint disease, we can use a procedure called radiofrequency ablation and prevent the conduction of pain information for an extended period of time as discussed by your physician.    What happens during the procedure?    Lying on your stomach or side, the skin over year neck, midback or low back will be well cleaned.  The physician will numb a small area of skin with numbing medicine which may sting for a few seconds.  The physician will use x-ray guidance to direct a special needle along side the targeted medial branch nerves.  Then a small amount of contrast dye may be injected confirming proper placement.  Then a " nerve small amount of local anesthetic will be injected over the nerve.      What happens after the procedure?    Your arm or chest wall or leg may temporarily feel numb or weak from the anesthetic.  A dressing may be applied to the injection site.  Your will remain for about 15 to 20 minutes and the nurse will monitor blood pressure and pulse.  The nurse will review your discharge instructions and will be able to go home.  A pain diary will be provided for you to keep a record over the next 24 hours.  Over that time, you should perform activities that normally cause you pain (within reason). This will help us determine the success of the injection.  After you have finished with the diary, please mail, fax or drop off the completed diary and we will be in contact with you.    2-3x/week

## 2025-09-09 ENCOUNTER — TRANSCRIPTION ENCOUNTER (OUTPATIENT)
Age: 75
End: 2025-09-09

## 2025-09-09 ENCOUNTER — NON-APPOINTMENT (OUTPATIENT)
Age: 75
End: 2025-09-09

## 2025-09-24 PROBLEM — R91.8 LUNG NODULES: Status: ACTIVE | Noted: 2025-09-24
